# Patient Record
Sex: FEMALE | Race: WHITE | HISPANIC OR LATINO | Employment: OTHER | URBAN - METROPOLITAN AREA
[De-identification: names, ages, dates, MRNs, and addresses within clinical notes are randomized per-mention and may not be internally consistent; named-entity substitution may affect disease eponyms.]

---

## 2021-03-29 ENCOUNTER — IMMUNIZATION (OUTPATIENT)
Dept: PRIMARY CARE CLINIC | Facility: CLINIC | Age: 46
End: 2021-03-29
Payer: MEDICAID

## 2021-03-29 DIAGNOSIS — Z23 NEED FOR VACCINATION: Primary | ICD-10-CM

## 2021-03-29 PROCEDURE — 0011A PR IMMUNIZ ADMIN, SARS-COV-2 COVID-19 VACC, 100MCG/0.5ML, 1ST DOSE: ICD-10-PCS | Mod: CV19,S$GLB,, | Performed by: INTERNAL MEDICINE

## 2021-03-29 PROCEDURE — 91301 PR SARS-COV-2 COVID-19 VACCINE, NO PRSV, 100MCG/0.5ML, IM: ICD-10-PCS | Mod: S$GLB,,, | Performed by: INTERNAL MEDICINE

## 2021-03-29 PROCEDURE — 91301 PR SARS-COV-2 COVID-19 VACCINE, NO PRSV, 100MCG/0.5ML, IM: CPT | Mod: S$GLB,,, | Performed by: INTERNAL MEDICINE

## 2021-03-29 PROCEDURE — 0011A PR IMMUNIZ ADMIN, SARS-COV-2 COVID-19 VACC, 100MCG/0.5ML, 1ST DOSE: CPT | Mod: CV19,S$GLB,, | Performed by: INTERNAL MEDICINE

## 2021-03-29 RX ADMIN — Medication 0.5 ML: at 03:03

## 2021-03-30 ENCOUNTER — LAB VISIT (OUTPATIENT)
Dept: LAB | Facility: HOSPITAL | Age: 46
End: 2021-03-30
Attending: FAMILY MEDICINE
Payer: MEDICAID

## 2021-03-30 ENCOUNTER — OFFICE VISIT (OUTPATIENT)
Dept: FAMILY MEDICINE | Facility: HOSPITAL | Age: 46
End: 2021-03-30
Attending: FAMILY MEDICINE
Payer: MEDICAID

## 2021-03-30 VITALS
HEIGHT: 61 IN | SYSTOLIC BLOOD PRESSURE: 138 MMHG | HEART RATE: 79 BPM | DIASTOLIC BLOOD PRESSURE: 83 MMHG | BODY MASS INDEX: 24.69 KG/M2 | WEIGHT: 130.75 LBS

## 2021-03-30 DIAGNOSIS — D49.2 EAR TUMORS: Primary | ICD-10-CM

## 2021-03-30 DIAGNOSIS — D49.2 EAR TUMORS: ICD-10-CM

## 2021-03-30 DIAGNOSIS — E28.319 EARLY MENOPAUSE: ICD-10-CM

## 2021-03-30 LAB
ALBUMIN SERPL BCP-MCNC: 4.4 G/DL (ref 3.5–5.2)
ALP SERPL-CCNC: 81 U/L (ref 55–135)
ALT SERPL W/O P-5'-P-CCNC: 14 U/L (ref 10–44)
ANION GAP SERPL CALC-SCNC: 7 MMOL/L (ref 8–16)
AST SERPL-CCNC: 16 U/L (ref 10–40)
BASOPHILS # BLD AUTO: 0.04 K/UL (ref 0–0.2)
BASOPHILS NFR BLD: 0.7 % (ref 0–1.9)
BILIRUB SERPL-MCNC: 0.2 MG/DL (ref 0.1–1)
BUN SERPL-MCNC: 13 MG/DL (ref 6–20)
CALCIUM SERPL-MCNC: 9.3 MG/DL (ref 8.7–10.5)
CCP AB SER IA-ACNC: <0.5 U/ML
CHLORIDE SERPL-SCNC: 105 MMOL/L (ref 95–110)
CHOLEST SERPL-MCNC: 210 MG/DL (ref 120–199)
CHOLEST/HDLC SERPL: 3.6 {RATIO} (ref 2–5)
CO2 SERPL-SCNC: 30 MMOL/L (ref 23–29)
CREAT SERPL-MCNC: 0.9 MG/DL (ref 0.5–1.4)
CRP SERPL-MCNC: 4.4 MG/L (ref 0–8.2)
DIFFERENTIAL METHOD: ABNORMAL
EOSINOPHIL # BLD AUTO: 0.1 K/UL (ref 0–0.5)
EOSINOPHIL NFR BLD: 1.6 % (ref 0–8)
ERYTHROCYTE [DISTWIDTH] IN BLOOD BY AUTOMATED COUNT: 13.2 % (ref 11.5–14.5)
ERYTHROCYTE [SEDIMENTATION RATE] IN BLOOD BY WESTERGREN METHOD: 31 MM/HR (ref 0–20)
EST. GFR  (AFRICAN AMERICAN): >60 ML/MIN/1.73 M^2
EST. GFR  (NON AFRICAN AMERICAN): >60 ML/MIN/1.73 M^2
ESTIMATED AVG GLUCOSE: 100 MG/DL (ref 68–131)
GLUCOSE SERPL-MCNC: 70 MG/DL (ref 70–110)
HBA1C MFR BLD: 5.1 % (ref 4–5.6)
HCT VFR BLD AUTO: 37.5 % (ref 37–48.5)
HDLC SERPL-MCNC: 59 MG/DL (ref 40–75)
HDLC SERPL: 28.1 % (ref 20–50)
HGB BLD-MCNC: 12 G/DL (ref 12–16)
IMM GRANULOCYTES # BLD AUTO: 0.01 K/UL (ref 0–0.04)
IMM GRANULOCYTES NFR BLD AUTO: 0.2 % (ref 0–0.5)
LDLC SERPL CALC-MCNC: 84.6 MG/DL (ref 63–159)
LYMPHOCYTES # BLD AUTO: 1.9 K/UL (ref 1–4.8)
LYMPHOCYTES NFR BLD: 33.2 % (ref 18–48)
MCH RBC QN AUTO: 26.5 PG (ref 27–31)
MCHC RBC AUTO-ENTMCNC: 32 G/DL (ref 32–36)
MCV RBC AUTO: 83 FL (ref 82–98)
MONOCYTES # BLD AUTO: 0.4 K/UL (ref 0.3–1)
MONOCYTES NFR BLD: 7.5 % (ref 4–15)
NEUTROPHILS # BLD AUTO: 3.3 K/UL (ref 1.8–7.7)
NEUTROPHILS NFR BLD: 56.8 % (ref 38–73)
NONHDLC SERPL-MCNC: 151 MG/DL
NRBC BLD-RTO: 0 /100 WBC
PLATELET # BLD AUTO: 306 K/UL (ref 150–450)
PMV BLD AUTO: 10.3 FL (ref 9.2–12.9)
POTASSIUM SERPL-SCNC: 3.9 MMOL/L (ref 3.5–5.1)
PROT SERPL-MCNC: 8.4 G/DL (ref 6–8.4)
RBC # BLD AUTO: 4.53 M/UL (ref 4–5.4)
RHEUMATOID FACT SERPL-ACNC: <10 IU/ML (ref 0–15)
SODIUM SERPL-SCNC: 142 MMOL/L (ref 136–145)
TRIGL SERPL-MCNC: 332 MG/DL (ref 30–150)
TSH SERPL DL<=0.005 MIU/L-ACNC: 0.66 UIU/ML (ref 0.4–4)
WBC # BLD AUTO: 5.75 K/UL (ref 3.9–12.7)

## 2021-03-30 PROCEDURE — 86431 RHEUMATOID FACTOR QUANT: CPT | Performed by: STUDENT IN AN ORGANIZED HEALTH CARE EDUCATION/TRAINING PROGRAM

## 2021-03-30 PROCEDURE — 86235 NUCLEAR ANTIGEN ANTIBODY: CPT | Performed by: STUDENT IN AN ORGANIZED HEALTH CARE EDUCATION/TRAINING PROGRAM

## 2021-03-30 PROCEDURE — 80061 LIPID PANEL: CPT | Performed by: STUDENT IN AN ORGANIZED HEALTH CARE EDUCATION/TRAINING PROGRAM

## 2021-03-30 PROCEDURE — 86803 HEPATITIS C AB TEST: CPT | Performed by: STUDENT IN AN ORGANIZED HEALTH CARE EDUCATION/TRAINING PROGRAM

## 2021-03-30 PROCEDURE — 84443 ASSAY THYROID STIM HORMONE: CPT | Performed by: STUDENT IN AN ORGANIZED HEALTH CARE EDUCATION/TRAINING PROGRAM

## 2021-03-30 PROCEDURE — 83036 HEMOGLOBIN GLYCOSYLATED A1C: CPT | Performed by: STUDENT IN AN ORGANIZED HEALTH CARE EDUCATION/TRAINING PROGRAM

## 2021-03-30 PROCEDURE — 36415 COLL VENOUS BLD VENIPUNCTURE: CPT | Performed by: STUDENT IN AN ORGANIZED HEALTH CARE EDUCATION/TRAINING PROGRAM

## 2021-03-30 PROCEDURE — 86200 CCP ANTIBODY: CPT | Performed by: STUDENT IN AN ORGANIZED HEALTH CARE EDUCATION/TRAINING PROGRAM

## 2021-03-30 PROCEDURE — 80053 COMPREHEN METABOLIC PANEL: CPT | Performed by: STUDENT IN AN ORGANIZED HEALTH CARE EDUCATION/TRAINING PROGRAM

## 2021-03-30 PROCEDURE — 85025 COMPLETE CBC W/AUTO DIFF WBC: CPT | Performed by: STUDENT IN AN ORGANIZED HEALTH CARE EDUCATION/TRAINING PROGRAM

## 2021-03-30 PROCEDURE — 85652 RBC SED RATE AUTOMATED: CPT | Performed by: STUDENT IN AN ORGANIZED HEALTH CARE EDUCATION/TRAINING PROGRAM

## 2021-03-30 PROCEDURE — 99213 OFFICE O/P EST LOW 20 MIN: CPT | Performed by: STUDENT IN AN ORGANIZED HEALTH CARE EDUCATION/TRAINING PROGRAM

## 2021-03-30 PROCEDURE — 86140 C-REACTIVE PROTEIN: CPT | Performed by: STUDENT IN AN ORGANIZED HEALTH CARE EDUCATION/TRAINING PROGRAM

## 2021-03-30 PROCEDURE — 86038 ANTINUCLEAR ANTIBODIES: CPT | Performed by: STUDENT IN AN ORGANIZED HEALTH CARE EDUCATION/TRAINING PROGRAM

## 2021-03-31 LAB
ANA SER QL IF: NORMAL
HCV AB SERPL QL IA: NEGATIVE

## 2021-04-01 LAB
ANTI-SSA ANTIBODY: 0.06 RATIO (ref 0–0.99)
ANTI-SSA INTERPRETATION: NEGATIVE

## 2021-04-14 ENCOUNTER — PATIENT MESSAGE (OUTPATIENT)
Dept: FAMILY MEDICINE | Facility: HOSPITAL | Age: 46
End: 2021-04-14

## 2021-04-28 ENCOUNTER — IMMUNIZATION (OUTPATIENT)
Dept: PRIMARY CARE CLINIC | Facility: CLINIC | Age: 46
End: 2021-04-28
Payer: MEDICAID

## 2021-04-28 DIAGNOSIS — Z23 NEED FOR VACCINATION: Primary | ICD-10-CM

## 2021-04-28 PROCEDURE — 0012A PR IMMUNIZ ADMIN, SARS-COV-2 COVID-19 VACC, 100MCG/0.5ML, 2ND DOSE: ICD-10-PCS | Mod: CV19,S$GLB,, | Performed by: INTERNAL MEDICINE

## 2021-04-28 PROCEDURE — 91301 PR SARS-COV-2 COVID-19 VACCINE, NO PRSV, 100MCG/0.5ML, IM: ICD-10-PCS | Mod: S$GLB,,, | Performed by: INTERNAL MEDICINE

## 2021-04-28 PROCEDURE — 0012A PR IMMUNIZ ADMIN, SARS-COV-2 COVID-19 VACC, 100MCG/0.5ML, 2ND DOSE: CPT | Mod: CV19,S$GLB,, | Performed by: INTERNAL MEDICINE

## 2021-04-28 PROCEDURE — 91301 PR SARS-COV-2 COVID-19 VACCINE, NO PRSV, 100MCG/0.5ML, IM: CPT | Mod: S$GLB,,, | Performed by: INTERNAL MEDICINE

## 2021-04-28 RX ADMIN — Medication 0.5 ML: at 05:04

## 2022-01-16 ENCOUNTER — OFFICE VISIT (OUTPATIENT)
Dept: URGENT CARE | Facility: CLINIC | Age: 47
End: 2022-01-16
Payer: MEDICAID

## 2022-01-16 VITALS
RESPIRATION RATE: 18 BRPM | HEIGHT: 61 IN | OXYGEN SATURATION: 98 % | WEIGHT: 120 LBS | DIASTOLIC BLOOD PRESSURE: 82 MMHG | TEMPERATURE: 99 F | BODY MASS INDEX: 22.66 KG/M2 | HEART RATE: 69 BPM | SYSTOLIC BLOOD PRESSURE: 121 MMHG

## 2022-01-16 DIAGNOSIS — U07.1 COVID-19: ICD-10-CM

## 2022-01-16 DIAGNOSIS — J02.9 SORE THROAT: Primary | ICD-10-CM

## 2022-01-16 PROBLEM — H95.191: Status: ACTIVE | Noted: 2021-12-22

## 2022-01-16 PROBLEM — H90.71 MIXED CONDUCTIVE AND SENSORINEURAL HEARING LOSS OF RIGHT EAR WITH UNRESTRICTED HEARING OF LEFT EAR: Status: ACTIVE | Noted: 2021-12-22

## 2022-01-16 PROBLEM — H66.91 RIGHT CHRONIC OTITIS MEDIA: Status: ACTIVE | Noted: 2021-12-22

## 2022-01-16 LAB
CTP QC/QA: YES
SARS-COV-2 RDRP RESP QL NAA+PROBE: POSITIVE

## 2022-01-16 PROCEDURE — U0002 COVID-19 LAB TEST NON-CDC: HCPCS | Mod: QW,CR,S$GLB,

## 2022-01-16 PROCEDURE — U0002: ICD-10-PCS | Mod: QW,CR,S$GLB,

## 2022-01-16 PROCEDURE — 1160F RVW MEDS BY RX/DR IN RCRD: CPT | Mod: CPTII,S$GLB,,

## 2022-01-16 PROCEDURE — 3008F PR BODY MASS INDEX (BMI) DOCUMENTED: ICD-10-PCS | Mod: CPTII,S$GLB,,

## 2022-01-16 PROCEDURE — 3079F PR MOST RECENT DIASTOLIC BLOOD PRESSURE 80-89 MM HG: ICD-10-PCS | Mod: CPTII,S$GLB,,

## 2022-01-16 PROCEDURE — 99203 OFFICE O/P NEW LOW 30 MIN: CPT | Mod: S$GLB,,,

## 2022-01-16 PROCEDURE — 1159F PR MEDICATION LIST DOCUMENTED IN MEDICAL RECORD: ICD-10-PCS | Mod: CPTII,S$GLB,,

## 2022-01-16 PROCEDURE — 3074F PR MOST RECENT SYSTOLIC BLOOD PRESSURE < 130 MM HG: ICD-10-PCS | Mod: CPTII,S$GLB,,

## 2022-01-16 PROCEDURE — 3079F DIAST BP 80-89 MM HG: CPT | Mod: CPTII,S$GLB,,

## 2022-01-16 PROCEDURE — 1160F PR REVIEW ALL MEDS BY PRESCRIBER/CLIN PHARMACIST DOCUMENTED: ICD-10-PCS | Mod: CPTII,S$GLB,,

## 2022-01-16 PROCEDURE — 3074F SYST BP LT 130 MM HG: CPT | Mod: CPTII,S$GLB,,

## 2022-01-16 PROCEDURE — 99203 PR OFFICE/OUTPT VISIT, NEW, LEVL III, 30-44 MIN: ICD-10-PCS | Mod: S$GLB,,,

## 2022-01-16 PROCEDURE — 3008F BODY MASS INDEX DOCD: CPT | Mod: CPTII,S$GLB,,

## 2022-01-16 PROCEDURE — 1159F MED LIST DOCD IN RCRD: CPT | Mod: CPTII,S$GLB,,

## 2022-01-16 NOTE — PROGRESS NOTES
"Subjective:       Patient ID: Gabrielle Chung is a 46 y.o. female.    Vitals:  height is 5' 1" (1.549 m) and weight is 54.4 kg (120 lb). Her temperature is 98.9 °F (37.2 °C). Her blood pressure is 121/82 and her pulse is 69. Her respiration is 18 and oxygen saturation is 98%.     Chief Complaint: Sore Throat    Pt. presents to clinic with c/o sore throat for over 1 week. She denies any other symptoms. Denies any fever or body aches.     Sore Throat   This is a new problem. The current episode started in the past 7 days. The problem has been unchanged. Neither side of throat is experiencing more pain than the other. There has been no fever. Pertinent negatives include no coughing, diarrhea, ear pain, neck pain or vomiting. She has tried nothing for the symptoms. The treatment provided no relief.       Constitution: Negative for chills, sweating, fatigue and fever.   HENT: Positive for sore throat. Negative for ear pain and tinnitus.    Neck: Negative for neck pain and neck stiffness.   Cardiovascular: Negative for chest trauma and chest pain.   Eyes: Negative for eye itching, eye pain and eye redness.   Respiratory: Negative for cough, sputum production and bloody sputum.    Gastrointestinal: Negative for nausea, vomiting, constipation and diarrhea.   Skin: Negative for color change, pale, rash and hives.   Allergic/Immunologic: Negative for hives, itching and sneezing.   Neurological: Negative for disorientation and altered mental status.   Psychiatric/Behavioral: Negative for altered mental status, disorientation and confusion.       Objective:      Physical Exam   Constitutional: She is oriented to person, place, and time. She appears well-developed and well-nourished. She is cooperative.  Non-toxic appearance. She does not have a sickly appearance. She does not appear ill. No distress.   HENT:   Head: Normocephalic and atraumatic.   Ears:   Right Ear: Hearing, tympanic membrane, external ear and ear canal normal. "   Left Ear: Hearing, tympanic membrane, external ear and ear canal normal.   Nose: Nose normal. No mucosal edema, rhinorrhea or nasal deformity. No epistaxis. Right sinus exhibits no maxillary sinus tenderness and no frontal sinus tenderness. Left sinus exhibits no maxillary sinus tenderness and no frontal sinus tenderness.   Mouth/Throat: Uvula is midline and mucous membranes are normal. No trismus in the jaw. Normal dentition. No uvula swelling. Posterior oropharyngeal erythema present. No oropharyngeal exudate or posterior oropharyngeal edema.   Eyes: Conjunctivae and lids are normal. No scleral icterus.   Neck: Trachea normal and phonation normal. Neck supple. No edema present. No erythema present. No neck rigidity present.   Cardiovascular: Normal rate, regular rhythm, normal heart sounds, intact distal pulses and normal pulses.   Pulmonary/Chest: Effort normal and breath sounds normal. No respiratory distress. She has no decreased breath sounds. She has no rhonchi.   Abdominal: Normal appearance.   Musculoskeletal: Normal range of motion.         General: No deformity or edema. Normal range of motion.   Neurological: She is alert and oriented to person, place, and time. She exhibits normal muscle tone. Coordination normal.   Skin: Skin is warm, dry, intact, not diaphoretic and not pale.   Psychiatric: She has a normal mood and affect. Her speech is normal and behavior is normal. Judgment and thought content normal. Cognition and memory  Nursing note and vitals reviewed.    Results for orders placed or performed in visit on 01/16/22   POCT COVID-19 Rapid Screening   Result Value Ref Range    POC Rapid COVID Positive (A) Negative     Acceptable Yes      0        Assessment:       1. Sore throat    2. COVID-19          Plan:         Sore throat  -     POCT COVID-19 Rapid Screening    COVID-19                  Patient Instructions    You have tested positive for COVID-19 today.       ISOLATION  If  you tested positive and do not have symptoms, you must isolate for 5 days starting on the day of the positive test. I     If you tested positive and have symptoms, you must isolate for 5 days starting on the day of the first symptoms,  not the day of the positive test.     This is the most important part, both the CDC and the [facility] emphasize that you do not test out of isolation.     After 5 days, if your symptoms have improved and you have not had fever on day 5, you can return to the community on day 6- NO TESTING REQUIRED!      In fact, we do not retest if you were positive in the last 90 days.     After your 5 days of isolation are completed, the CDC recommends strict mask use for the first 5 days that you come out of isolation.      CDC Testing and Quarantine Guidelines for patients with exposure to a known-positive COVID-19 person:  ·     A 'close exposure' is defined as anyone who has had an exposure (masked or unmasked) to a known COVID -19 positive person          within 6 feet of someone          for a cumulative total of 15 minutes or more over a 24-hour period.  ·     vaccinated Have been boosted or completed the primary series of Pfizer or Moderna vaccine within the last 6 months or completed the primary series of J&J vaccine within the last 2 months and/or had a positive test within 90 days          do NOT need to quarantine after contact with someone who had COVID-19 unless they have symptoms.          fully vaccinated people who have not had a positive test within 90 days, should get tested 3-5 days after their exposure, even if they don't have symptoms and wear a mask indoors in public for 10 days following exposure or until their test result is negative on day 5.          If you develop symptoms test and quarantine.     ·     Unvaccinated, or are more than six months out from their second mRNA dose (or more than 2 months after the J&J vaccine) and not yet boosted,  and/or NOT had a positive test  within 90 days and meet 'close exposure'  you are required by CDC guidelines to quarantine for at least 5 days from time of exposure followed by 5 days of strict masking. It is recommended, but not required to test after 5 days, unless you develop symptoms, in which case you should test at that time.  If you do decide to test at 5 days and are asymptomatic, the risk is that if you test without symptoms on Day 5 for example) and you are positive, your 5 day isolation begins on that day, and you turned your 5 day quarantine into 10 days.          If your exposure does not meet the above definition, you can return to your normal daily activities to include social distancing, wearing a mask and frequent handwashing.  Alternatively, if a 5-day quarantine is not feasible, it is imperative that an exposed person wear a well-fitting mask at all times when around others for 10 days after exposure.          - Rest.    - Drink plenty of fluids.    - Acetaminophen (tylenol) or Ibuprofen (advil,motrin) as directed as needed for fever/pain. Avoid tylenol if you have a history of liver disease. Do not take ibuprofen if you have a history of GI bleeding, kidney disease, or if you take blood thinners.     - You must understand that you have received an Urgent Care treatment only and that you may be released before all of your medical problems are known or treated.   - You, the patient, will arrange for follow up care as instructed.   - If your condition worsens or fails to improve we recommend that you receive another evaluation at the ER immediately or contact your PCP to discuss your concerns or return here.   - Follow up with your PCP or specialty clinic as directed in the next 1-2 weeks if not improved or as needed.  You can call [phone] to schedule an appointment with the appropriate provider.      If your symptoms do not improve or worsen, go to the emergency room immediately.    Patient Education       COVID-19  Discharge Instructions   About this topic   Coronavirus disease 2019 is also known as COVID-19. It is a viral illness that infects the lungs. It is caused by a virus called SARS-associated coronavirus (SARS-CoV-2).  The signs of COVID-19 most often start a few days after you have been infected. In some people, it takes longer to show signs. Others never show signs of the infection. You may have a cough, fever, shaking chills and it may be hard to breathe. You may be very tired, have muscle aches, a headache or sore throat. Some people have an upset stomach or loose stools. Others lose their sense of smell or taste. You may not have these signs all the time and they may come and go while you are sick.  The virus spreads easily through droplets when you talk, sneeze, or cough. You can pass the virus to others when you are talking close together, singing, hugging, sharing food, or shaking hands. Doctors believe the germs also survive on surfaces like tables, door handles, and telephones. However, this is not a common way that COVID-19 spreads. Doctors believe you can also spread the infection even if you dont have any symptoms, but they do not know how that happens. This is why getting vaccinated is one of the best ways to keep you healthy and slow the spread of the virus.  Some people have a mild case of COVID-19 and are able to stay at home and away from others until they feel better. Others may need to be in the hospital if they are very sick. Some people with COVID-19 can have some symptoms for weeks or months. People with COVID-19 must isolate themselves. You can start to be around others when your doctor says it is safe to do so.       What care is needed at home?   · Ask your doctor what you need to do when you go home. Make sure you ask questions if you do not understand what the doctor says.  · Drink lots of water, juice, or broth to replace fluids lost from a fever.  · You may use cool mist humidifiers to  help ease congestion and coughing.  · Use 2 to 3 pillows to prop yourself up when you lie down to make it easier to breathe and sleep.  · Do not smoke and do not drink beer, wine, and mixed drinks (alcohol).  · To lower the chance of passing the infection to others, get a COVID-19 vaccine after your infection has resolved.  · If you have not been fully vaccinated:  ? Wear a mask over your mouth and nose if you are around others who are not sick. Cloth masks work best if they have more than one layer of fabric.  ? Wash your hands often.  ? Stay home in a separate room, if possible, away from others. Only go out to get medical care.  ? Use a separate bathroom if possible.  ? Do not make food for others.  What follow-up care is needed?   · Your doctor may ask you to make visits to the office to check on your progress. Be sure to keep these visits. Make sure you wear a mask at these visits.  · If you can, tell the staff you have COVID-19 ahead of time so they can take extra care to stop the disease from spreading.  · It may take a few weeks before your health returns to normal.  What drugs may be needed?   The doctor may order drugs to:  · Help with breathing  · Help with fever  · Help with swelling in your airways and lungs  · Control coughing  · Ease a sore throat  · Help a runny or stuffy nose  Will physical activity be limited?   You may have to limit your physical activity. Talk to your doctor about the right amount of activity for you. If you have been very sick with COVID-19, it can take some time to get your strength back.  Will there be any other care needed?   Doctors do not know how long you can pass the virus on to others after you are sick. This is why it is important to stay in a separate room, if possible, when you are sick. For now, doctors are giving general guidelines for you to follow after you have been sick. Before you go around other people, you should:  · Be fever free for 24 hours without taking  any drugs to lower the fever  · Have no symptoms of cough or shortness of breath  · Wait at least 10 days after first having symptoms or your first positive test, and you need to be symptom free as above. Some experts suggest waiting 20 days if you have had a more severe infection.  Talk with your doctor about getting a COVID-19 vaccine.  What problems could happen?   · Fluid loss. This is dehydration.  · Short-term or long-term lung damage  · Heart problems  · Death  When do I need to call the doctor?   · You are having so much trouble breathing that you can only say one or two words at a time.  · You need to sit upright at all times to be able to breathe and/or cannot lie down.  · You are very confused or cannot stay awake.  · Your lips or skin start to turn blue or grey.  · You think you might be having a medical emergency. Some examples of medical emergencies are:  ? Severe chest pain.  ? Not able to speak or move normally.  · You have trouble breathing when talking or sitting still.  · You have new shortness of breath.  · You become weak or dizzy.  · You have very dark urine or do not pass urine for more than 8 hours.  · You have new or worsening COVID-19 symptoms like:  ? Fever  ? Cough  ? Feeling very tired  ? Shaking chills  ? Headache  ? Trouble swallowing  ? Throwing up  ? Loose stools  ? Reddish purple spots on your fingers or toes  Teach Back: Helping You Understand   The Teach Back Method helps you understand the information we are giving you. After you talk with the staff, tell them in your own words what you learned. This helps to make sure the staff has described each thing clearly. It also helps to explain things that may have been confusing. Before going home, make sure you can do these:  · I can tell you about my condition.  · I can tell you what may help ease my breathing.  · I can tell you what I can do to help avoid passing the infection to others.  · I can tell you what I will do if I have  trouble breathing; feel sleepy or confused; or my fingertips, fingernails, skin, or lips are blue.  Where can I learn more?   Centers for Disease Control and Prevention  https://www.cdc.gov/coronavirus/2019-ncov/about/index.html   Centers for Disease Control and Prevention  https://www.cdc.gov/coronavirus/2019-ncov/hcp/disposition-in-home-patients.html   World Health Organization  https://www.who.int/news-room/q-a-detail/l-z-ogfzxouoxcygl   Last Reviewed Date   2021-10-05  Consumer Information Use and Disclaimer   This information is not specific medical advice and does not replace information you receive from your health care provider. This is only a brief summary of general information. It does NOT include all information about conditions, illnesses, injuries, tests, procedures, treatments, therapies, discharge instructions or life-style choices that may apply to you. You must talk with your health care provider for complete information about your health and treatment options. This information should not be used to decide whether or not to accept your health care providers advice, instructions or recommendations. Only your health care provider has the knowledge and training to provide advice that is right for you.  Copyright   Copyright © 2021 [employer] and its affiliates and/or licensors. All rights reserved.

## 2022-01-16 NOTE — LETTER
January 16, 2022      [facility]  [address] [address]  Phone: [phone]  Fax: [phone]       Patient: Gabrielle Chung   YOB: 1975  Date of Visit: 01/16/2022    To Whom It May Concern:    Mahendra Chung  was at [facility] on 01/16/2022. The patient may return to work/school on 01/17/22 with no restrictions. If you have any questions or concerns, or if I can be of further assistance, please do not hesitate to contact me.    Sincerely,    Danielle Hays PA-C

## 2022-01-16 NOTE — PATIENT INSTRUCTIONS
You have tested positive for COVID-19 today.       ISOLATION  If you tested positive and do not have symptoms, you must isolate for 5 days starting on the day of the positive test. I     If you tested positive and have symptoms, you must isolate for 5 days starting on the day of the first symptoms,  not the day of the positive test.     This is the most important part, both the CDC and the [facility] emphasize that you do not test out of isolation.     After 5 days, if your symptoms have improved and you have not had fever on day 5, you can return to the community on day 6- NO TESTING REQUIRED!      In fact, we do not retest if you were positive in the last 90 days.     After your 5 days of isolation are completed, the CDC recommends strict mask use for the first 5 days that you come out of isolation.      CDC Testing and Quarantine Guidelines for patients with exposure to a known-positive COVID-19 person:  ·     A 'close exposure' is defined as anyone who has had an exposure (masked or unmasked) to a known COVID -19 positive person          within 6 feet of someone          for a cumulative total of 15 minutes or more over a 24-hour period.  ·     vaccinated Have been boosted or completed the primary series of Pfizer or Moderna vaccine within the last 6 months or completed the primary series of J&J vaccine within the last 2 months and/or had a positive test within 90 days          do NOT need to quarantine after contact with someone who had COVID-19 unless they have symptoms.          fully vaccinated people who have not had a positive test within 90 days, should get tested 3-5 days after their exposure, even if they don't have symptoms and wear a mask indoors in public for 10 days following exposure or until their test result is negative on day 5.          If you develop symptoms test and quarantine.     ·     Unvaccinated, or are more than six months out from their second mRNA dose (or more than 2 months after the J&J  vaccine) and not yet boosted,  and/or NOT had a positive test within 90 days and meet 'close exposure'  you are required by CDC guidelines to quarantine for at least 5 days from time of exposure followed by 5 days of strict masking. It is recommended, but not required to test after 5 days, unless you develop symptoms, in which case you should test at that time.  If you do decide to test at 5 days and are asymptomatic, the risk is that if you test without symptoms on Day 5 for example) and you are positive, your 5 day isolation begins on that day, and you turned your 5 day quarantine into 10 days.          If your exposure does not meet the above definition, you can return to your normal daily activities to include social distancing, wearing a mask and frequent handwashing.  Alternatively, if a 5-day quarantine is not feasible, it is imperative that an exposed person wear a well-fitting mask at all times when around others for 10 days after exposure.          - Rest.    - Drink plenty of fluids.    - Acetaminophen (tylenol) or Ibuprofen (advil,motrin) as directed as needed for fever/pain. Avoid tylenol if you have a history of liver disease. Do not take ibuprofen if you have a history of GI bleeding, kidney disease, or if you take blood thinners.     - You must understand that you have received an Urgent Care treatment only and that you may be released before all of your medical problems are known or treated.   - You, the patient, will arrange for follow up care as instructed.   - If your condition worsens or fails to improve we recommend that you receive another evaluation at the ER immediately or contact your PCP to discuss your concerns or return here.   - Follow up with your PCP or specialty clinic as directed in the next 1-2 weeks if not improved or as needed.  You can call [phone] to schedule an appointment with the appropriate provider.      If your symptoms do not improve or worsen, go to the emergency  room immediately.    Patient Education       COVID-19 Discharge Instructions   About this topic   Coronavirus disease 2019 is also known as COVID-19. It is a viral illness that infects the lungs. It is caused by a virus called SARS-associated coronavirus (SARS-CoV-2).  The signs of COVID-19 most often start a few days after you have been infected. In some people, it takes longer to show signs. Others never show signs of the infection. You may have a cough, fever, shaking chills and it may be hard to breathe. You may be very tired, have muscle aches, a headache or sore throat. Some people have an upset stomach or loose stools. Others lose their sense of smell or taste. You may not have these signs all the time and they may come and go while you are sick.  The virus spreads easily through droplets when you talk, sneeze, or cough. You can pass the virus to others when you are talking close together, singing, hugging, sharing food, or shaking hands. Doctors believe the germs also survive on surfaces like tables, door handles, and telephones. However, this is not a common way that COVID-19 spreads. Doctors believe you can also spread the infection even if you dont have any symptoms, but they do not know how that happens. This is why getting vaccinated is one of the best ways to keep you healthy and slow the spread of the virus.  Some people have a mild case of COVID-19 and are able to stay at home and away from others until they feel better. Others may need to be in the hospital if they are very sick. Some people with COVID-19 can have some symptoms for weeks or months. People with COVID-19 must isolate themselves. You can start to be around others when your doctor says it is safe to do so.       What care is needed at home?   · Ask your doctor what you need to do when you go home. Make sure you ask questions if you do not understand what the doctor says.  · Drink lots of water, juice, or broth to replace fluids lost  from a fever.  · You may use cool mist humidifiers to help ease congestion and coughing.  · Use 2 to 3 pillows to prop yourself up when you lie down to make it easier to breathe and sleep.  · Do not smoke and do not drink beer, wine, and mixed drinks (alcohol).  · To lower the chance of passing the infection to others, get a COVID-19 vaccine after your infection has resolved.  · If you have not been fully vaccinated:  ? Wear a mask over your mouth and nose if you are around others who are not sick. Cloth masks work best if they have more than one layer of fabric.  ? Wash your hands often.  ? Stay home in a separate room, if possible, away from others. Only go out to get medical care.  ? Use a separate bathroom if possible.  ? Do not make food for others.  What follow-up care is needed?   · Your doctor may ask you to make visits to the office to check on your progress. Be sure to keep these visits. Make sure you wear a mask at these visits.  · If you can, tell the staff you have COVID-19 ahead of time so they can take extra care to stop the disease from spreading.  · It may take a few weeks before your health returns to normal.  What drugs may be needed?   The doctor may order drugs to:  · Help with breathing  · Help with fever  · Help with swelling in your airways and lungs  · Control coughing  · Ease a sore throat  · Help a runny or stuffy nose  Will physical activity be limited?   You may have to limit your physical activity. Talk to your doctor about the right amount of activity for you. If you have been very sick with COVID-19, it can take some time to get your strength back.  Will there be any other care needed?   Doctors do not know how long you can pass the virus on to others after you are sick. This is why it is important to stay in a separate room, if possible, when you are sick. For now, doctors are giving general guidelines for you to follow after you have been sick. Before you go around other people, you  should:  · Be fever free for 24 hours without taking any drugs to lower the fever  · Have no symptoms of cough or shortness of breath  · Wait at least 10 days after first having symptoms or your first positive test, and you need to be symptom free as above. Some experts suggest waiting 20 days if you have had a more severe infection.  Talk with your doctor about getting a COVID-19 vaccine.  What problems could happen?   · Fluid loss. This is dehydration.  · Short-term or long-term lung damage  · Heart problems  · Death  When do I need to call the doctor?   · You are having so much trouble breathing that you can only say one or two words at a time.  · You need to sit upright at all times to be able to breathe and/or cannot lie down.  · You are very confused or cannot stay awake.  · Your lips or skin start to turn blue or grey.  · You think you might be having a medical emergency. Some examples of medical emergencies are:  ? Severe chest pain.  ? Not able to speak or move normally.  · You have trouble breathing when talking or sitting still.  · You have new shortness of breath.  · You become weak or dizzy.  · You have very dark urine or do not pass urine for more than 8 hours.  · You have new or worsening COVID-19 symptoms like:  ? Fever  ? Cough  ? Feeling very tired  ? Shaking chills  ? Headache  ? Trouble swallowing  ? Throwing up  ? Loose stools  ? Reddish purple spots on your fingers or toes  Teach Back: Helping You Understand   The Teach Back Method helps you understand the information we are giving you. After you talk with the staff, tell them in your own words what you learned. This helps to make sure the staff has described each thing clearly. It also helps to explain things that may have been confusing. Before going home, make sure you can do these:  · I can tell you about my condition.  · I can tell you what may help ease my breathing.  · I can tell you what I can do to help avoid passing the infection to  others.  · I can tell you what I will do if I have trouble breathing; feel sleepy or confused; or my fingertips, fingernails, skin, or lips are blue.  Where can I learn more?   Centers for Disease Control and Prevention  https://www.cdc.gov/coronavirus/2019-ncov/about/index.html   Centers for Disease Control and Prevention  https://www.cdc.gov/coronavirus/2019-ncov/hcp/disposition-in-home-patients.html   World Health Organization  https://www.who.int/news-room/q-a-detail/a-k-ewbmcmyhxovcx   Last Reviewed Date   2021-10-05  Consumer Information Use and Disclaimer   This information is not specific medical advice and does not replace information you receive from your health care provider. This is only a brief summary of general information. It does NOT include all information about conditions, illnesses, injuries, tests, procedures, treatments, therapies, discharge instructions or life-style choices that may apply to you. You must talk with your health care provider for complete information about your health and treatment options. This information should not be used to decide whether or not to accept your health care providers advice, instructions or recommendations. Only your health care provider has the knowledge and training to provide advice that is right for you.  Copyright   Copyright © 2021 [employer] and its affiliates and/or licensors. All rights reserved.

## 2022-06-13 ENCOUNTER — OFFICE VISIT (OUTPATIENT)
Dept: FAMILY MEDICINE | Facility: HOSPITAL | Age: 47
End: 2022-06-13
Attending: FAMILY MEDICINE
Payer: MEDICAID

## 2022-06-13 VITALS
WEIGHT: 122.81 LBS | SYSTOLIC BLOOD PRESSURE: 123 MMHG | HEIGHT: 61 IN | HEART RATE: 62 BPM | BODY MASS INDEX: 23.19 KG/M2 | DIASTOLIC BLOOD PRESSURE: 82 MMHG

## 2022-06-13 DIAGNOSIS — Z12.31 SCREENING MAMMOGRAM FOR BREAST CANCER: ICD-10-CM

## 2022-06-13 DIAGNOSIS — Z12.11 SCREENING FOR MALIGNANT NEOPLASM OF COLON: ICD-10-CM

## 2022-06-13 DIAGNOSIS — Z23 IMMUNIZATION DUE: Primary | ICD-10-CM

## 2022-06-13 PROCEDURE — 99213 OFFICE O/P EST LOW 20 MIN: CPT | Performed by: STUDENT IN AN ORGANIZED HEALTH CARE EDUCATION/TRAINING PROGRAM

## 2022-06-13 PROCEDURE — 90715 TDAP VACCINE 7 YRS/> IM: CPT

## 2022-06-13 NOTE — PROGRESS NOTES
Per verbal order Dr. Payan, pt given TDAP vaccine IM in right deltoid. Bandaid applied. Tolerated well.

## 2022-06-13 NOTE — PROGRESS NOTES
"Progress Note  [facility]      Chief Complaint:   Chief Complaint   Patient presents with    Referral     Mammogram        Subjective:    Gabrielle Chung is a 46 y.o.female with no pertinent history who is here for preventative care. Due for mammo, reported normal in the past. Never had a colonoscopy. Due for TDAP. Concerned that she may need hep b vaccine. May need ear surgery per ENT.     Review of Systems    Review of Systems   Constitutional: Negative for chills, fever and weight loss.   Eyes: Negative for blurred vision.   Respiratory: Negative for cough, hemoptysis, sputum production and shortness of breath.    Cardiovascular: Negative for chest pain, palpitations, orthopnea, claudication and leg swelling.   Gastrointestinal: Negative for abdominal pain, blood in stool, constipation, diarrhea, heartburn, melena, nausea and vomiting.   Genitourinary: Negative for dysuria, hematuria and urgency.   Skin: Negative for itching and rash.   Neurological: Negative for dizziness, tingling, weakness and headaches.   Psychiatric/Behavioral: Negative for depression, substance abuse and suicidal ideas. The patient is not nervous/anxious and does not have insomnia.         Past medical, past surgical, social, and family history reviewed.    Objective:    /82 (BP Location: Left arm)   Pulse 62   Ht 5' 1" (1.549 m)   Wt 55.7 kg (122 lb 12.7 oz)   BMI 23.20 kg/m²     Physical Exam:  Physical Exam  Vitals and nursing note reviewed.   Constitutional:       General: She is not in acute distress.     Appearance: She is not diaphoretic.   HENT:      Head: Normocephalic and atraumatic.   Cardiovascular:      Rate and Rhythm: Normal rate and regular rhythm.      Heart sounds: No murmur heard.    No friction rub. No gallop.   Pulmonary:      Effort: Pulmonary effort is normal. No respiratory distress.      Breath sounds: Normal breath sounds. No wheezing or rales.   Chest:      Chest wall: No tenderness.   Abdominal: "      General: Bowel sounds are normal. There is no distension.      Palpations: Abdomen is soft.      Tenderness: There is no abdominal tenderness.   Skin:     General: Skin is warm and dry.      Findings: No erythema.   Neurological:      Mental Status: She is alert and oriented to person, place, and time.   Psychiatric:         Mood and Affect: Mood and affect normal.         Cognition and Memory: Memory normal.         Judgment: Judgment normal.         Laboratory:    Reviewed labs and imaging.      Assessment Plan    1. Screening mammogram for breast cancer  - Mammo Digital Screening Bilat w/ T[facility]; Future    2. Screening for malignant neoplasm of colon  - Case Request Endoscopy: COLONOSCOPY    3. Immunization due  Will obtain hep b s ab titer to determine need for hep b vaccine.   - (In Office Administered) Tdap Vaccine  - Hepatitis B Surface Ab, Qualitative; Future        Follow Up:  6 months or sooner PRN    The patient's diagnosis and medications were discussed.    I will review labs and notify patient with results either by mail or contact by phone.      06/13/2022  Brant Payan M.D.  [facility] PGY-3    *This note was dictated using the M*Modal Fluency Direct word recognition program. There are word recognition mistakes that are occasionally missed on review.

## 2022-06-14 NOTE — PROGRESS NOTES
I assume primary medical responsibility for this patient. I have reviewed the history, physical, and assessement & treatment plan with the resident and agree that the care is reasonable and necessary. This service has been performed by a resident without the presence of a teaching physician under the primary care exception. If necessary, an addendum of additional findings or evaluation beyond the resident documentation will be noted below.    Wellness visit completed.     Nery Zabala MD    [facility]

## 2022-06-15 ENCOUNTER — HOSPITAL ENCOUNTER (OUTPATIENT)
Dept: RADIOLOGY | Facility: HOSPITAL | Age: 47
Discharge: HOME | End: 2022-06-15
Attending: STUDENT IN AN ORGANIZED HEALTH CARE EDUCATION/TRAINING PROGRAM
Payer: MEDICAID

## 2022-06-15 DIAGNOSIS — Z12.31 SCREENING MAMMOGRAM FOR BREAST CANCER: ICD-10-CM

## 2022-06-15 PROCEDURE — 77067 MAMMO DIGITAL SCREENING BILAT WITH TOMO: ICD-10-PCS | Mod: 26,,, | Performed by: RADIOLOGY

## 2022-06-15 PROCEDURE — 77063 BREAST TOMOSYNTHESIS BI: CPT | Mod: TC

## 2022-06-15 PROCEDURE — 77067 SCR MAMMO BI INCL CAD: CPT | Mod: 26,,, | Performed by: RADIOLOGY

## 2022-06-15 PROCEDURE — 77063 MAMMO DIGITAL SCREENING BILAT WITH TOMO: ICD-10-PCS | Mod: 26,,, | Performed by: RADIOLOGY

## 2022-06-15 PROCEDURE — 77063 BREAST TOMOSYNTHESIS BI: CPT | Mod: 26,,, | Performed by: RADIOLOGY

## 2022-06-23 ENCOUNTER — OFFICE VISIT (OUTPATIENT)
Dept: FAMILY MEDICINE | Facility: HOSPITAL | Age: 47
End: 2022-06-23
Attending: SPECIALIST
Payer: MEDICAID

## 2022-06-23 VITALS
HEIGHT: 61 IN | HEART RATE: 73 BPM | BODY MASS INDEX: 23.11 KG/M2 | DIASTOLIC BLOOD PRESSURE: 76 MMHG | SYSTOLIC BLOOD PRESSURE: 110 MMHG | WEIGHT: 122.38 LBS

## 2022-06-23 DIAGNOSIS — Z12.4 SCREENING FOR CERVICAL CANCER: ICD-10-CM

## 2022-06-23 DIAGNOSIS — Z78.0 MENOPAUSE: ICD-10-CM

## 2022-06-23 DIAGNOSIS — Z28.39 IMMUNIZATION DEFICIENCY: Primary | ICD-10-CM

## 2022-06-23 PROCEDURE — 99213 OFFICE O/P EST LOW 20 MIN: CPT | Performed by: STUDENT IN AN ORGANIZED HEALTH CARE EDUCATION/TRAINING PROGRAM

## 2022-06-27 NOTE — PROGRESS NOTES
"Progress Note  [facility]      Chief Complaint:   Chief Complaint   Patient presents with    Gynecologic Exam        Subjective:    Gabrielle Chung is a 46 y.o.female with no pertinent history who is here for pap smear. She has no concerns today.   Review of Systems    Review of Systems   Constitutional: Negative for chills, fever and weight loss.   Eyes: Negative for blurred vision.   Respiratory: Negative for cough, hemoptysis, sputum production and shortness of breath.    Cardiovascular: Negative for chest pain, palpitations, orthopnea, claudication and leg swelling.   Gastrointestinal: Negative for abdominal pain, blood in stool, constipation, diarrhea, heartburn, melena, nausea and vomiting.   Genitourinary: Negative for dysuria, hematuria and urgency.   Skin: Negative for itching and rash.   Neurological: Negative for dizziness, tingling, weakness and headaches.   Psychiatric/Behavioral: Negative for depression, substance abuse and suicidal ideas. The patient is not nervous/anxious and does not have insomnia.         Past medical, past surgical, social, and family history reviewed.    Objective:    /76   Pulse 73   Ht 5' 1" (1.549 m)   Wt 55.5 kg (122 lb 5.7 oz)   BMI 23.12 kg/m²     Physical Exam:  Physical Exam  Vitals and nursing note reviewed. Exam conducted with a chaperone present.   Constitutional:       General: She is not in acute distress.     Appearance: She is not diaphoretic.   HENT:      Head: Normocephalic and atraumatic.   Cardiovascular:      Rate and Rhythm: Normal rate and regular rhythm.      Heart sounds: No murmur heard.    No friction rub. No gallop.   Pulmonary:      Effort: Pulmonary effort is normal. No respiratory distress.      Breath sounds: Normal breath sounds. No wheezing or rales.   Chest:      Chest wall: No tenderness.   Abdominal:      General: Bowel sounds are normal. There is no distension.      Palpations: Abdomen is soft.      Tenderness: There is no " abdominal tenderness.   Genitourinary:     General: Normal vulva.      Vagina: Normal.      Cervix: Normal and dilated.      Uterus: Normal.       Adnexa: Right adnexa normal and left adnexa normal.   Skin:     General: Skin is warm and dry.      Findings: No erythema.   Neurological:      Mental Status: She is alert and oriented to person, place, and time.   Psychiatric:         Mood and Affect: Mood and affect normal.         Cognition and Memory: Memory normal.         Judgment: Judgment normal.         Laboratory:    Reviewed labs and imaging.      Assessment Plan    1. Immunization deficiency  - Rubella antibody, IgG; Future  - Rubeola antibody IgG; Future  - Mumps, IgG Screen; Future  - Hepatitis B Surface Antibody, Qual/Quant; Future  - Varicella zoster antibody, IgG; Future    2. Menopause  - Follicle Stimulating Hormone; Future    3. Screening for cervical cancer  Pap smear performed      Follow Up:  Annually    The patient's diagnosis and medications were discussed.    I will review labs and notify patient with results either by mail or contact by phone.      06/27/2022  Brant Payan M.D.  [facility] PGY-3    *This note was dictated using the M*Modal Fluency Direct word recognition program. There are word recognition mistakes that are occasionally missed on review.

## 2022-06-30 NOTE — PROGRESS NOTES
I assume primary medical responsibility for this patient. I have reviewed the history, physical, and assessement & treatment plan with the resident and agree that the care is reasonable and necessary. This service has been performed by a resident without the presence of a teaching physician under the primary care exception. If necessary, an addendum of additional findings or evaluation beyond the resident documentation will be noted below.     Anahi Carrera MD

## 2023-05-17 ENCOUNTER — OFFICE VISIT (OUTPATIENT)
Dept: FAMILY MEDICINE | Facility: HOSPITAL | Age: 48
End: 2023-05-17
Payer: MEDICAID

## 2023-05-17 VITALS
HEART RATE: 59 BPM | BODY MASS INDEX: 21.15 KG/M2 | WEIGHT: 123.88 LBS | DIASTOLIC BLOOD PRESSURE: 82 MMHG | SYSTOLIC BLOOD PRESSURE: 128 MMHG | HEIGHT: 64 IN

## 2023-05-17 DIAGNOSIS — Z12.11 SCREENING FOR COLORECTAL CANCER: ICD-10-CM

## 2023-05-17 DIAGNOSIS — M54.50 LUMBAR BACK PAIN: Primary | ICD-10-CM

## 2023-05-17 DIAGNOSIS — Z12.39 ENCOUNTER FOR SCREENING FOR MALIGNANT NEOPLASM OF BREAST, UNSPECIFIED SCREENING MODALITY: ICD-10-CM

## 2023-05-17 DIAGNOSIS — Z12.12 SCREENING FOR COLORECTAL CANCER: ICD-10-CM

## 2023-05-17 PROCEDURE — 99213 OFFICE O/P EST LOW 20 MIN: CPT

## 2023-05-17 RX ORDER — LIDOCAINE 50 MG/G
1 PATCH TOPICAL DAILY
Qty: 30 PATCH | Refills: 0 | Status: SHIPPED | OUTPATIENT
Start: 2023-05-17

## 2023-05-17 RX ORDER — DICLOFENAC SODIUM 10 MG/G
2 GEL TOPICAL 4 TIMES DAILY
Qty: 200 G | Refills: 1 | Status: SHIPPED | OUTPATIENT
Start: 2023-05-17
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
## 2023-05-18 NOTE — PROGRESS NOTES
I have reviewed the notes, assessments, and/or procedures performed by Dr. Tamez, I concur with her/his documentation of Gabrielle Chung. Back pain without radiculopathy. PT. Health maintenance addressed

## 2023-05-19 ENCOUNTER — CLINICAL SUPPORT (OUTPATIENT)
Dept: REHABILITATION | Facility: HOSPITAL | Age: 48
End: 2023-05-19
Payer: MEDICAID

## 2023-05-19 DIAGNOSIS — M25.651 DECREASED RANGE OF MOTION OF BOTH HIPS: ICD-10-CM

## 2023-05-19 DIAGNOSIS — M62.81 MUSCLE WEAKNESS OF LOWER EXTREMITY: ICD-10-CM

## 2023-05-19 DIAGNOSIS — M25.652 DECREASED RANGE OF MOTION OF BOTH HIPS: ICD-10-CM

## 2023-05-19 DIAGNOSIS — M54.50 LUMBAR BACK PAIN: ICD-10-CM

## 2023-05-19 DIAGNOSIS — Z74.09 IMPAIRED FUNCTIONAL MOBILITY AND ACTIVITY TOLERANCE: ICD-10-CM

## 2023-05-19 PROCEDURE — 97162 PT EVAL MOD COMPLEX 30 MIN: CPT | Mod: PN

## 2023-05-19 PROCEDURE — 97530 THERAPEUTIC ACTIVITIES: CPT | Mod: PN
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
## 2023-05-23 ENCOUNTER — CLINICAL SUPPORT (OUTPATIENT)
Dept: REHABILITATION | Facility: HOSPITAL | Age: 48
End: 2023-05-23
Payer: MEDICAID

## 2023-05-23 DIAGNOSIS — M62.81 MUSCLE WEAKNESS OF LOWER EXTREMITY: ICD-10-CM

## 2023-05-23 DIAGNOSIS — Z74.09 IMPAIRED FUNCTIONAL MOBILITY AND ACTIVITY TOLERANCE: Primary | ICD-10-CM

## 2023-05-23 DIAGNOSIS — M25.652 DECREASED RANGE OF MOTION OF BOTH HIPS: ICD-10-CM

## 2023-05-23 DIAGNOSIS — M25.651 DECREASED RANGE OF MOTION OF BOTH HIPS: ICD-10-CM

## 2023-05-23 PROCEDURE — 97110 THERAPEUTIC EXERCISES: CPT | Mod: PN,CQ

## 2023-05-23 NOTE — PROGRESS NOTES
"[facility]   Physical Therapy Treatment Note      Name: Gabrielle Chung  Clinic Number: [id number]    Therapy Diagnosis:   Encounter Diagnoses   Name Primary?    Impaired functional mobility and activity tolerance Yes    Muscle weakness of lower extremity     Decreased range of motion of both hips      Physician: Stefano Tamez MD    Visit Date: 5/23/2023    Encounter Diagnosis   Name Primary?    Lumbar back pain           Physician: Stefano Tamez MD     Physician Orders: PT Eval and Treat   Medical Diagnosis from Referral: M54.50 (ICD-10-CM) - Lumbar back pain  Evaluation Date: 5/19/2023  Authorization Period Expiration: 12/31/2023  Plan of Care Expiration: 6/30/2023  Progress Note Due: 6/9/2023  Visit # / Visits authorized: 2/ pending    FOTO: 2/5     Precautions: Standard     PTA Visit #: 1/5     Time In: 11:00 am  Time Out: 11:55 am  Total Billable Time: 55 minutes    Subjective     Pt reports: feels a little better since she is doing the home exercise program. She sleeps on her stomach and that also helps to decrease the pain .  She was compliant with home exercise program.  Response to previous treatment: less pain  Functional change: Ongoing    Pain: 5/10  Location: bilateral buttocks      Objective      Objective Measures updated at progress report unless specified.     Treatment     Gabrielle received the treatments listed below:      therapeutic exercises to develop strength, endurance, ROM, flexibility, posture, and core stabilization for 50 minutes including:  Prone with forehead on rolled towel 2 minutes with breathing (reduced pain, but attempted gluteal squeezes increased pain so deferred)  Hook lying Transverse abdominus x15 reps 5 sec holds  Brace marching 2 trials of 1 minute   Hook lying ADDuctor isometric 15x5: holds with sm ball  Hook lying ADBuctor isometric 15x5" holds with belt around knees  LTRs: 10x  piriformis stretch: 2x30"  Crossed leg hip internal rotation: " "2x30"  Hamstring stretch 3x30" bilateral with strap  Edgar position quad stretch 3x30" bilateral          manual therapy techniques: Manual traction, Myofacial release, Soft tissue Mobilization, and Friction Massage were applied to the: bilateral lower extremity quadrants  for 5 minutes, including:  Muscle energy technique     neuromuscular re-education activities to improve: Kinesthetic, Sense, Proprioception, and Posture for 0 minutes. The following activities were included:  NP     therapeutic activities to improve functional performance for 0  minutes, including:  NP         Patient Education and Home Exercises       Education provided:   - home exercise program     Written Home Exercises Provided: Patient instructed to cont prior HEP. Exercises were reviewed and Gabrielle was able to demonstrate them prior to the end of the session.  Gabrielle demonstrated good  understanding of the education provided. See EMR under Patient Instructions for exercises provided during therapy sessions    Assessment     Gabrielle presents for her first follow up with continued bilateral piriformis pain right > left. She reports the exercises help  and she has less pain now than at the time of evaluation. Exhibits pelvic asymmetry right up slip innominate. This was  Approximated with muscle energy technique and returned her to pelvic symmetry. Following today's interventions she exited clinic with decreased young to 2/10.     Gabrielle Is progressing well towards her goals.   Pt prognosis is Good.     Pt will continue to benefit from skilled outpatient physical therapy to address the deficits listed in the problem list box on initial evaluation, provide pt/family education and to maximize pt's level of independence in the home and community environment.     Pt's spiritual, cultural and educational needs considered and pt agreeable to plan of care and goals.     Anticipated barriers to physical therapy: chronicity     Goals:   Short Term Goals (3 " Weeks):  1. Patient will be compliant with home exercise program to supplement therapy in promoting functional mobility.  2. Patient will reports </= 5/10 pain at rest to improve quality of life.  3. Patient will report no pain during thoracolumbar active range of motion to promote functional mobility.  4. Patient will improve impaired lower extremity myotomes/manual muscle tests  to >/=4/5 to improve strength for functional tasks.     Long Term Goals (6 Weeks):   1. Patient will improve FOTO score to </= 39% limited to decrease perceived limitation with maintaining/changing body position.   2. Patient will perform transverse abdominus contraction with good control to demonstrate improved core strength.  3. Patient will improve impaired lower extremity myotomes/manual muscle tests to >/=4+/5 to improve strength for functional tasks.  4. Patient will improve bilateral hip external rotation AROM to 30 degrees to improve mobility for functional activities.  5. Patient will be able to return to free-weight exercising at the gym to return to prior level of function.  6. Patient will reports </= 1/10 pain at rest to improve quality of life.  Plan     Continue PT plan of care     Enmanuel Butler, LILLIAN

## 2023-05-26 ENCOUNTER — CLINICAL SUPPORT (OUTPATIENT)
Dept: REHABILITATION | Facility: HOSPITAL | Age: 48
End: 2023-05-26
Payer: MEDICAID

## 2023-05-26 DIAGNOSIS — Z74.09 IMPAIRED FUNCTIONAL MOBILITY AND ACTIVITY TOLERANCE: Primary | ICD-10-CM

## 2023-05-26 DIAGNOSIS — M25.651 DECREASED RANGE OF MOTION OF BOTH HIPS: ICD-10-CM

## 2023-05-26 DIAGNOSIS — M25.652 DECREASED RANGE OF MOTION OF BOTH HIPS: ICD-10-CM

## 2023-05-26 DIAGNOSIS — M62.81 MUSCLE WEAKNESS OF LOWER EXTREMITY: ICD-10-CM

## 2023-05-26 PROCEDURE — 97110 THERAPEUTIC EXERCISES: CPT | Mod: PN,CQ

## 2023-05-26 NOTE — PROGRESS NOTES
"[facility]   Physical Therapy Treatment Note      Name: Gabrielle Chung  Clinic Number: [id number]    Therapy Diagnosis:   Encounter Diagnoses   Name Primary?    Impaired functional mobility and activity tolerance Yes    Muscle weakness of lower extremity     Decreased range of motion of both hips          Physician: No ref. provider found    Visit Date: 5/26/2023    Encounter Diagnosis   Name Primary?    Lumbar back pain           Physician: Stefano Tamez MD     Physician Orders: PT Eval and Treat   Medical Diagnosis from Referral: M54.50 (ICD-10-CM) - Lumbar back pain  Evaluation Date: 5/19/2023  Authorization Period Expiration: 12/31/2023  Plan of Care Expiration: 6/30/2023  Progress Note Due: 6/9/2023  Visit # / Visits authorized: 3/ pending    FOTO: 3/5     Precautions: Standard     PTA Visit #: 2/5     Time In: 0815   Time Out: 0900  Total Billable Time: 45 minutes    Subjective     Pt reports: feels much better since last visit. She is very surprised that she tolerated her hard day at work yesterday relatively pain free.  She is late today because she hd to meet and an .  She was compliant with home exercise program.  Response to previous treatment: less pain  Functional change: Improved functional mobility    Pain: 2/10  Location: bilateral buttocks      Objective      Right up slip pelvic innominate     Treatment     Gabrielle received the treatments listed below:      therapeutic exercises to develop strength, endurance, ROM, flexibility, posture, and core stabilization for 45 minutes including:  Prone with forehead on rolled towel 2 minutes with breathing (reduced pain, but attempted gluteal squeezes increased pain so deferred)  Hook lying Transverse abdominus x15 reps 5 sec holds  Brace marching 2 trials of 1 minute   Hook lying ADDuctor isometric 15x5: holds with sm ball  Hook lying ADBuctor isometric 15x5" holds with belt around knees  Side lying reverse clam shells " "2x10   Side lying clamshells 2x10  LTRs: 10x  piriformis stretch: 2x30"  Crossed leg hip internal rotation: 2x30"  Hamstring stretch 3x30" bilateral with strap  Edgar position quad stretch 3x30" bilateral          manual therapy techniques: Manual traction, Myofacial release, Soft tissue Mobilization, and Friction Massage were applied to the: bilateral lower extremity quadrants  for 5 minutes, including:  Muscle energy technique     neuromuscular re-education activities to improve: Kinesthetic, Sense, Proprioception, and Posture for 0 minutes. The following activities were included:  NP     therapeutic activities to improve functional performance for 0  minutes, including:  NP         Patient Education and Home Exercises       Education provided:   - home exercise program   -How to perform muscle energy technique to correct pelvic asymmetry     Written Home Exercises Provided: Patient instructed to cont prior HEP. Exercises were reviewed and Gabrielle was able to demonstrate them prior to the end of the session.  Gabrielle demonstrated good  understanding of the education provided. See EMR under Patient Instructions for exercises provided during therapy sessions    Assessment     Gabrielle presents  2nd follow up with much less pain 2/10. And reports improved tolerance to work challenges. She has been doing her home exercise program. Exhibits pelvic asymmetry right up slip innominate. This was approximated with muscle energy technique and returned her to pelvic symmetry. Following today's interventions she exited clinic with decreased pina to 0/10.     Gabrielle Is progressing well towards her goals.   Pt prognosis is Good.     Pt will continue to benefit from skilled outpatient physical therapy to address the deficits listed in the problem list box on initial evaluation, provide pt/family education and to maximize pt's level of independence in the home and community environment.     Pt's spiritual, cultural and educational needs " considered and pt agreeable to plan of care and goals.     Anticipated barriers to physical therapy: chronicity     Goals:   Short Term Goals (3 Weeks):  1. Patient will be compliant with home exercise program to supplement therapy in promoting functional mobility.  2. Patient will reports </= 5/10 pain at rest to improve quality of life.  3. Patient will report no pain during thoracolumbar active range of motion to promote functional mobility.  4. Patient will improve impaired lower extremity myotomes/manual muscle tests  to >/=4/5 to improve strength for functional tasks.     Long Term Goals (6 Weeks):   1. Patient will improve FOTO score to </= 39% limited to decrease perceived limitation with maintaining/changing body position.   2. Patient will perform transverse abdominus contraction with good control to demonstrate improved core strength.  3. Patient will improve impaired lower extremity myotomes/manual muscle tests to >/=4+/5 to improve strength for functional tasks.  4. Patient will improve bilateral hip external rotation AROM to 30 degrees to improve mobility for functional activities.  5. Patient will be able to return to free-weight exercising at the gym to return to prior level of function.  6. Patient will reports </= 1/10 pain at rest to improve quality of life.  Plan     Continue PT plan of care     Enmanuel Butler, PTA

## 2023-05-31 ENCOUNTER — CLINICAL SUPPORT (OUTPATIENT)
Dept: REHABILITATION | Facility: HOSPITAL | Age: 48
End: 2023-05-31
Payer: MEDICAID

## 2023-05-31 DIAGNOSIS — M62.81 MUSCLE WEAKNESS OF LOWER EXTREMITY: ICD-10-CM

## 2023-05-31 DIAGNOSIS — M25.651 DECREASED RANGE OF MOTION OF BOTH HIPS: ICD-10-CM

## 2023-05-31 DIAGNOSIS — M25.652 DECREASED RANGE OF MOTION OF BOTH HIPS: ICD-10-CM

## 2023-05-31 DIAGNOSIS — Z74.09 IMPAIRED FUNCTIONAL MOBILITY AND ACTIVITY TOLERANCE: Primary | ICD-10-CM

## 2023-05-31 PROCEDURE — 97110 THERAPEUTIC EXERCISES: CPT | Mod: PN

## 2023-05-31 NOTE — PROGRESS NOTES
"[facility]   Physical Therapy Treatment Note      Name: Gabrielle Chung  Clinic Number: [id number]    Therapy Diagnosis:   Encounter Diagnoses   Name Primary?    Impaired functional mobility and activity tolerance Yes    Muscle weakness of lower extremity     Decreased range of motion of both hips      Physician: Stefano Tamez MD    Visit Date: 5/31/2023    Physician Orders: PT Eval and Treat   Medical Diagnosis from Referral: M54.50 (ICD-10-CM) - Lumbar back pain  Evaluation Date: 5/19/2023  Authorization Period Expiration: 12/31/2023  Plan of Care Expiration: 6/30/2023  Progress Note Due: 6/9/2023  Visit # / Visits authorized: 3/20  FOTO: 3/5     Precautions: Standard     PTA Visit #: 2/5     Time In: 8:03 am   Time Out: 8:50 am (Patient requested to leave early as she returned from out of town late last night)  Total Billable Time: 47 minutes    Subjective     Pt reports: pain levels fluctuating daily. Some days will be fine and sometimes she will get sudden sharp pain that can last a few hours.     She was compliant with home exercise program.  Response to previous treatment: less pain  Functional change: improved functional mobility; more tolerance to work activities    Pain: 2/10  Location: bilateral buttocks      Objective      5/31/2023  Right anterior innominate rotation (+)    Treatment     Gabrielle received the treatments listed below:      therapeutic exercises to develop strength, endurance, ROM, flexibility, posture, and core stabilization for 42 minutes including:    Bridges with hip abduction in blue belt: 2x10  Transverse abdominus contraction with ADDuctor isometric 15x5: holds with sm ball  Transverse abdominus contraction with ADBuctor isometric 15x5" holds with belt around knees  Transverse abdominus contraction 90-90 table top with alternating heel drop: 10x   Dead bug: 15x   Side lying reverse clam shells: 2x10   Side lying clamshells: green - 2x10  Prone hip extension: " "2x10; 2# ankle weight 10x bilateral  Prone hip extension with knee flexion to 90 degrees: 2#  - 3x10 bilateral  Piriformis stretch: 2x30"  Standing hip flexor stretch at 2nd step: 5x20" bilateral    OOT:  Prone with forehead on rolled towel 2 minutes with breathing (reduced pain, but attempted gluteal squeezes increased pain so deferred)  Crossed leg hip internal rotation: 2x30"  Hamstring stretch 3x30" bilateral with strap  Edgar position quad stretch 3x30" bilateral     manual therapy techniques: Manual traction, Myofacial release, Soft tissue Mobilization, and Friction Massage were applied to the: bilateral lower extremity quadrants  for 5 minutes, including:  Hip abduction/adduction METs (6x3")  METs with dowel - extension with right lower extremity and flexion left lower extremity (10x5")    neuromuscular re-education activities to improve: Kinesthetic, Sense, Proprioception, and Posture for 0 minutes. The following activities were included:  NP     therapeutic activities to improve functional performance for 0  minutes, including:  NP         Patient Education and Home Exercises       Education provided:   -updated HEP    Written Home Exercises Provided: Patient instructed to cont prior HEP. Exercises were reviewed and Gabrielle was able to demonstrate them prior to the end of the session.  Gabrielle demonstrated good  understanding of the education provided. See EMR under Patient Instructions for exercises provided during therapy sessions    Assessment   Gabrielle is a 47 y.o. female referred to outpatient Physical Therapy with a medical diagnosis of M54.50 (ICD-10-CM) - Lumbar back pain. Patient presents with signs and symptoms consistent with piriformis syndrome. Continued right anterior innominate rotation with resolution following muscle energy techniques. Responding well to muscle energy techniques, core stability program, and gluteal strengthening. Added higher level core and glute medius/chago strengthening " with good tolerance. Added hip flexor stretch at step for hip extension mobility. Verbalized 0/10 pain end of session. Provided updated home exercise program.    Gabrielle Is progressing well towards her goals.   Pt prognosis is Good.     Pt will continue to benefit from skilled outpatient physical therapy to address the deficits listed in the problem list box on initial evaluation, provide pt/family education and to maximize pt's level of independence in the home and community environment.     Pt's spiritual, cultural and educational needs considered and pt agreeable to plan of care and goals.     Anticipated barriers to physical therapy: chronicity     Goals:   Short Term Goals (3 Weeks):  1. Patient will be compliant with home exercise program to supplement therapy in promoting functional mobility.  2. Patient will reports </= 5/10 pain at rest to improve quality of life.  3. Patient will report no pain during thoracolumbar active range of motion to promote functional mobility.  4. Patient will improve impaired lower extremity myotomes/manual muscle tests  to >/=4/5 to improve strength for functional tasks.     Long Term Goals (6 Weeks):   1. Patient will improve FOTO score to </= 39% limited to decrease perceived limitation with maintaining/changing body position.   2. Patient will perform transverse abdominus contraction with good control to demonstrate improved core strength.  3. Patient will improve impaired lower extremity myotomes/manual muscle tests to >/=4+/5 to improve strength for functional tasks.  4. Patient will improve bilateral hip external rotation AROM to 30 degrees to improve mobility for functional activities.  5. Patient will be able to return to free-weight exercising at the gym to return to prior level of function.  6. Patient will reports </= 1/10 pain at rest to improve quality of life.  Plan   Monitor innominate symmetry   Hip mobility -- particularly hip extension and ER  core stability  program  Glute max/medius strengthening     Hollie Mcdonald, PT           car seat

## 2023-06-02 ENCOUNTER — CLINICAL SUPPORT (OUTPATIENT)
Dept: REHABILITATION | Facility: HOSPITAL | Age: 48
End: 2023-06-02
Payer: MEDICAID

## 2023-06-02 DIAGNOSIS — M25.652 DECREASED RANGE OF MOTION OF BOTH HIPS: ICD-10-CM

## 2023-06-02 DIAGNOSIS — M62.81 MUSCLE WEAKNESS OF LOWER EXTREMITY: ICD-10-CM

## 2023-06-02 DIAGNOSIS — M25.651 DECREASED RANGE OF MOTION OF BOTH HIPS: ICD-10-CM

## 2023-06-02 DIAGNOSIS — Z74.09 IMPAIRED FUNCTIONAL MOBILITY AND ACTIVITY TOLERANCE: Primary | ICD-10-CM

## 2023-06-02 PROCEDURE — 97110 THERAPEUTIC EXERCISES: CPT | Mod: PN

## 2023-06-02 NOTE — PROGRESS NOTES
"[facility]   Physical Therapy Treatment Note      Name: Gabrielle Chung  Clinic Number: [id number]    Therapy Diagnosis:   Encounter Diagnoses   Name Primary?    Impaired functional mobility and activity tolerance Yes    Muscle weakness of lower extremity     Decreased range of motion of both hips      Physician: Stefano Tamez MD    Visit Date: 6/2/2023    Physician Orders: PT Eval and Treat   Medical Diagnosis from Referral: M54.50 (ICD-10-CM) - Lumbar back pain  Evaluation Date: 5/19/2023  Authorization Period Expiration: 12/31/2023  Plan of Care Expiration: 6/30/2023  Progress Note Due: 6/9/2023  Visit # / Visits authorized: 4/20  FOTO: 4/5     Precautions: Standard     PTA Visit #: 2/5     Time In: 9:07 am   Time Out: 10:00 am  Total Billable Time: 53 minutes    Subjective     Pt reports: had a tough day at work, 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 No episodes of sharp pain since the past weekend.     She was compliant with home exercise program.  Response to previous treatment: muscle burn and fatigue; decreased pain   Functional change: improved functional mobility; more tolerance to work activities    Pain: 2/10  Location: bilateral buttocks      Objective      5/31/2023  Right anterior innominate rotation (+)    Treatment     Gabrielle received the treatments listed below:      therapeutic exercises to develop strength, endurance, ROM, flexibility, posture, and core stabilization for 42 minutes including:    Bridges with hip abduction in blue belt: 2x10  Prone quad stretch: 2x30" right   Prone extension to elbows: 10x (no change in symptoms during, but increased pain following)  Prone hip extension with knee flexion to 90 degrees: 2#  - 2x10 bilateral  Prone hip extension: 2x10 2# ankle weight  Dead bug: 15x   Transverse abdominus contraction 90-90 table top with alternating heel drop: 2x10   Swiss ball roll outs: 2x15 " "(reduced symptoms)  Seated figure 4 piriformis stretch: 2x30"  Step up with knee drive: 6" step with 5# dumbbell - 2x10 bilateral  Half kneeling hip flexor stretch with trunk rotation: 5x10" bilateral    OOT:  Crossed leg hip internal rotation: 3x30" left   Transverse abdominus contraction with ADDuctor isometric 15x5: holds with sm ball  Transverse abdominus contraction with ADBuctor isometric 15x5" holds with belt around knees  Side lying reverse clam shells: 2x10   Side lying clamshells: green - 2x10    manual therapy techniques: Manual traction, Myofacial release, Soft tissue Mobilization, and Friction Massage were applied to the: bilateral lower extremity quadrants  for 15 minutes, including:  METs with dowel - extension with right lower extremity and flexion left lower extremity (10x5")  L3-L5  - grade II-III  Right hip PA mobilizations    neuromuscular re-education activities to improve: Kinesthetic, Sense, Proprioception, and Posture for 0 minutes. The following activities were included:  NP     therapeutic activities to improve functional performance for 0  minutes, including:  NP         Patient Education and Home Exercises       Education provided:   -updated HEP    Written Home Exercises Provided: Patient instructed to cont prior HEP. Exercises were reviewed and Gabrielle was able to demonstrate them prior to the end of the session.  Gabrielle demonstrated good  understanding of the education provided. See EMR under Patient Instructions for exercises provided during therapy sessions    Assessment   Gabrielle is a 47 y.o. female referred to outpatient Physical Therapy with a medical diagnosis of M54.50 (ICD-10-CM) - Lumbar back pain. Patient presents with signs and symptoms consistent with piriformis syndrome. Added gluteal activation and strengthening in bold. Added some manual techniques to lumbar spine but some increase in pain levels - will hold next. Will educate on hip hinging technique next " visit.      Gabrielle Is progressing well towards her goals.   Pt prognosis is Good.     Pt will continue to benefit from skilled outpatient physical therapy to address the deficits listed in the problem list box on initial evaluation, provide pt/family education and to maximize pt's level of independence in the home and community environment.     Pt's spiritual, cultural and educational needs considered and pt agreeable to plan of care and goals.     Anticipated barriers to physical therapy: chronicity     Goals:   Short Term Goals (3 Weeks):  1. Patient will be compliant with home exercise program to supplement therapy in promoting functional mobility.  2. Patient will reports </= 5/10 pain at rest to improve quality of life.  3. Patient will report no pain during thoracolumbar active range of motion to promote functional mobility.  4. Patient will improve impaired lower extremity myotomes/manual muscle tests  to >/=4/5 to improve strength for functional tasks.     Long Term Goals (6 Weeks):   1. Patient will improve FOTO score to </= 39% limited to decrease perceived limitation with maintaining/changing body position.   2. Patient will perform transverse abdominus contraction with good control to demonstrate improved core strength.  3. Patient will improve impaired lower extremity myotomes/manual muscle tests to >/=4+/5 to improve strength for functional tasks.  4. Patient will improve bilateral hip external rotation AROM to 30 degrees to improve mobility for functional activities.  5. Patient will be able to return to free-weight exercising at the gym to return to prior level of function.  6. Patient will reports </= 1/10 pain at rest to improve quality of life.  Plan   Monitor innominate symmetry   Hip mobility -- particularly hip extension and ER  core stability program  Glute max/medius strengthening     Hollie Mcdonald, PT

## 2023-06-06 ENCOUNTER — CLINICAL SUPPORT (OUTPATIENT)
Dept: REHABILITATION | Facility: HOSPITAL | Age: 48
End: 2023-06-06
Payer: MEDICAID

## 2023-06-06 DIAGNOSIS — M25.651 DECREASED RANGE OF MOTION OF BOTH HIPS: ICD-10-CM

## 2023-06-06 DIAGNOSIS — Z74.09 IMPAIRED FUNCTIONAL MOBILITY AND ACTIVITY TOLERANCE: Primary | ICD-10-CM

## 2023-06-06 DIAGNOSIS — M62.81 MUSCLE WEAKNESS OF LOWER EXTREMITY: ICD-10-CM

## 2023-06-06 DIAGNOSIS — M25.652 DECREASED RANGE OF MOTION OF BOTH HIPS: ICD-10-CM

## 2023-06-06 PROCEDURE — 97110 THERAPEUTIC EXERCISES: CPT | Mod: PN

## 2023-06-06 NOTE — PROGRESS NOTES
"[facility]   Physical Therapy Treatment Note      Name: Gabrielle Chung  Clinic Number: [id number]    Therapy Diagnosis:   Encounter Diagnoses   Name Primary?    Impaired functional mobility and activity tolerance Yes    Muscle weakness of lower extremity     Decreased range of motion of both hips      Physician: Stefano Tamez MD    Visit Date: 6/6/2023    Physician Orders: PT Eval and Treat   Medical Diagnosis from Referral: M54.50 (ICD-10-CM) - Lumbar back pain  Evaluation Date: 5/19/2023  Authorization Period Expiration: 12/31/2023  Plan of Care Expiration: 6/30/2023  Progress Note Due: 6/9/2023  Visit # / Visits authorized: 5/20  FOTO: Next at discharge      Precautions: Standard     PTA Visit #: 2/5     Time In: 7:10 am   Time Out: 8:00  am  Total Billable Time: 50 minutes    Subjective     Pt reports: she had been feeling a lot better. Low grade pain at 1/10 today and over the weekend.    She was compliant with home exercise program.  Response to previous treatment: muscle burn and fatigue; decreased pain   Functional change: improved functional mobility; more tolerance to work activities    Pain: 1/10  Location: bilateral buttocks      Objective      5/31/2023  Right anterior innominate rotation (+)    Treatment     Gabrielle received the treatments listed below:      therapeutic exercises to develop strength, endurance, ROM, flexibility, posture, and core stabilization for 55 minutes including:    Transverse abdominus contraction 90-90 table top with alternating heel drop: 2x10   Dead bug: 2x10  Supine transverse abdominus contraction with straight arm pull down with alternating straight leg raise: 10x  Bridges with hip abduction in blue belt: 2x10  Single leg bridges: 2x10 each  Step up with knee drive: 6" step with 5# dumbbell - 2x10 bilateral  Half kneeling hip flexor stretch with trunk rotation: 5x10" bilateral  Bird Dog: 10x  Hip hinge: 15x  Dead lift: 12# kettle bell - 2x10 to " "stool      OOT:  Crossed leg hip internal rotation: 3x30" left   Transverse abdominus contraction with ADDuctor isometric 15x5: holds with sm ball  Transverse abdominus contraction with ADBuctor isometric 15x5" holds with belt around knees  Side lying reverse clam shells: 2x10   Side lying clamshells: green - 2x10    manual therapy techniques: Manual traction, Myofacial release, Soft tissue Mobilization, and Friction Massage were applied to the: bilateral lower extremity quadrants  for 00 minutes, including:  METs with dowel - extension with right lower extremity and flexion left lower extremity (10x5")  L3-L5  - grade II-III  Right hip PA mobilizations    neuromuscular re-education activities to improve: Kinesthetic, Sense, Proprioception, and Posture for 0 minutes. The following activities were included:  NP     therapeutic activities to improve functional performance for 0  minutes, including:  NP         Patient Education and Home Exercises       Education provided:   -updated HEP    Written Home Exercises Provided: Patient instructed to cont prior HEP. Exercises were reviewed and Gabrielle was able to demonstrate them prior to the end of the session.  Gabrielle demonstrated good  understanding of the education provided. See EMR under Patient Instructions for exercises provided during therapy sessions    Assessment   Gabrielle is a 47 y.o. female referred to outpatient Physical Therapy with a medical diagnosis of M54.50 (ICD-10-CM) - Lumbar back pain. Patient presents with signs and symptoms consistent with piriformis syndrome. Initiated hip hinging technique to promote proper lifting and carrying mechanics at home. Progressed core stability program and gluteus medius/chago strengthening with excellent tolerance. Will monitor response next visit and progress towards rotational core stability training.      Gabrielle Is progressing well towards her goals.   Pt prognosis is Good.     Pt will continue to benefit from skilled " outpatient physical therapy to address the deficits listed in the problem list box on initial evaluation, provide pt/family education and to maximize pt's level of independence in the home and community environment.     Pt's spiritual, cultural and educational needs considered and pt agreeable to plan of care and goals.     Anticipated barriers to physical therapy: chronicity     Goals:   Short Term Goals (3 Weeks):  1. Patient will be compliant with home exercise program to supplement therapy in promoting functional mobility.  2. Patient will reports </= 5/10 pain at rest to improve quality of life.  3. Patient will report no pain during thoracolumbar active range of motion to promote functional mobility.  4. Patient will improve impaired lower extremity myotomes/manual muscle tests  to >/=4/5 to improve strength for functional tasks.     Long Term Goals (6 Weeks):   1. Patient will improve FOTO score to </= 39% limited to decrease perceived limitation with maintaining/changing body position.   2. Patient will perform transverse abdominus contraction with good control to demonstrate improved core strength.  3. Patient will improve impaired lower extremity myotomes/manual muscle tests to >/=4+/5 to improve strength for functional tasks.  4. Patient will improve bilateral hip external rotation AROM to 30 degrees to improve mobility for functional activities.  5. Patient will be able to return to free-weight exercising at the gym to return to prior level of function.  6. Patient will reports </= 1/10 pain at rest to improve quality of life.  Plan   Monitor innominate symmetry   Hip mobility -- particularly hip extension and ER  core stability program  Glute max/medius strengthening     Hollie Mcdonald, PT

## 2023-06-09 LAB — NONINV COLON CA DNA+OCC BLD SCRN STL QL: NEGATIVE

## 2023-06-13 ENCOUNTER — CLINICAL SUPPORT (OUTPATIENT)
Dept: REHABILITATION | Facility: HOSPITAL | Age: 48
End: 2023-06-13
Payer: MEDICAID

## 2023-06-13 DIAGNOSIS — M25.651 DECREASED RANGE OF MOTION OF BOTH HIPS: ICD-10-CM

## 2023-06-13 DIAGNOSIS — Z74.09 IMPAIRED FUNCTIONAL MOBILITY AND ACTIVITY TOLERANCE: Primary | ICD-10-CM

## 2023-06-13 DIAGNOSIS — M25.652 DECREASED RANGE OF MOTION OF BOTH HIPS: ICD-10-CM

## 2023-06-13 DIAGNOSIS — M62.81 MUSCLE WEAKNESS OF LOWER EXTREMITY: ICD-10-CM

## 2023-06-13 PROCEDURE — 97110 THERAPEUTIC EXERCISES: CPT | Mod: PN

## 2023-06-13 NOTE — PROGRESS NOTES
"[facility]   Physical Therapy Treatment Note      Name: Gabrielle Chung  Clinic Number: [id number]    Therapy Diagnosis:   Encounter Diagnoses   Name Primary?    Impaired functional mobility and activity tolerance Yes    Muscle weakness of lower extremity     Decreased range of motion of both hips      Physician: Stefano Tamez MD    Visit Date: 6/13/2023    Physician Orders: PT Eval and Treat   Medical Diagnosis from Referral: M54.50 (ICD-10-CM) - Lumbar back pain  Evaluation Date: 5/19/2023  Authorization Period Expiration: 12/31/2023  Plan of Care Expiration: 6/30/2023  Progress Note Due: 6/9/2023  Visit # / Visits authorized: 5/20  FOTO: Next at discharge      Precautions: Standard     PTA Visit #: 2/5     Time In: 10:00 am   Time Out: 10:40 am (Patient requested to leave early - did not feel well)  Total Billable Time: 40 minutes    Subjective     Pt reports: presents with increased left hip pain beginning in left SIJ and radiating into anterior hip.     She was compliant with home exercise program.  Response to previous treatment: muscle burn and fatigue; decreased pain   Functional change: improved functional mobility; more tolerance to work activities    Pain: 2-3/10  Location: bilateral buttocks      Objective      5/31/2023  Right anterior innominate rotation (+)    Treatment     Gabrielle received the treatments listed below:      therapeutic exercises to develop strength, endurance, ROM, flexibility, posture, and core stabilization for 32 minutes including:    Transverse abdominus contraction with ADDuctor isometric 15x5: holds with sm ball  Transverse abdominus contraction with ADBuctor isometric 15x5" holds with belt around knees  Transverse abdominus contraction 90-90 table top with alternating heel drop: 2x10   Dead bug: 2x10  Supine transverse abdominus contraction with straight arm pull down with alternating straight leg raise: 10x  Bridges with hip abduction in blue belt: " "2x10  Single leg bridges: 2x10 each  Step up with knee drive: 6" step with 5# dumbbell - 2x10 bilateral  Half kneeling hip flexor stretch with trunk rotation: 5x10" bilateral  Bird Dog: 10x  Hip hinge: 15x  Dead lift: 12# nino bell - 2x10 to stool      OOT:  Crossed leg hip internal rotation: 3x30" left   Side lying reverse clam shells: 2x10   Side lying clamshells: green - 2x10    manual therapy techniques: Manual traction, Myofacial release, Soft tissue Mobilization, and Friction Massage were applied to the: bilateral lower extremity quadrants  for 8 minutes, including:  Right hip lateral axis distraction  Right hip long axis traction    neuromuscular re-education activities to improve: Kinesthetic, Sense, Proprioception, and Posture for 0 minutes. The following activities were included:  NP     therapeutic activities to improve functional performance for 0  minutes, including:  NP         Patient Education and Home Exercises       Education provided:   -updated HEP    Written Home Exercises Provided: Patient instructed to cont prior HEP. Exercises were reviewed and Gabrielle was able to demonstrate them prior to the end of the session.  Gabrielle demonstrated good  understanding of the education provided. See EMR under Patient Instructions for exercises provided during therapy sessions    Assessment   Gabrielle is a 47 y.o. female referred to outpatient Physical Therapy with a medical diagnosis of M54.50 (ICD-10-CM) - Lumbar back pain. Patient presents with signs and symptoms consistent with piriformis syndrome. Patient presents with increased left hip pain beginning in left SIJ and radiating into anterior hip. Improved with lateral and long-axis hip distraction. Continued with same therex as patient was not feeling well following covid-19 vaccination and requested to leave early. Will progress to more standing exercises versus mat in upcoming visits.      Gabrielle Is progressing well towards her goals.   Pt prognosis is " Good.     Pt will continue to benefit from skilled outpatient physical therapy to address the deficits listed in the problem list box on initial evaluation, provide pt/family education and to maximize pt's level of independence in the home and community environment.     Pt's spiritual, cultural and educational needs considered and pt agreeable to plan of care and goals.     Anticipated barriers to physical therapy: chronicity     Goals:   Short Term Goals (3 Weeks):  1. Patient will be compliant with home exercise program to supplement therapy in promoting functional mobility.  2. Patient will reports </= 5/10 pain at rest to improve quality of life.  3. Patient will report no pain during thoracolumbar active range of motion to promote functional mobility.  4. Patient will improve impaired lower extremity myotomes/manual muscle tests  to >/=4/5 to improve strength for functional tasks.     Long Term Goals (6 Weeks):   1. Patient will improve FOTO score to </= 39% limited to decrease perceived limitation with maintaining/changing body position.   2. Patient will perform transverse abdominus contraction with good control to demonstrate improved core strength.  3. Patient will improve impaired lower extremity myotomes/manual muscle tests to >/=4+/5 to improve strength for functional tasks.  4. Patient will improve bilateral hip external rotation AROM to 30 degrees to improve mobility for functional activities.  5. Patient will be able to return to free-weight exercising at the gym to return to prior level of function.  6. Patient will reports </= 1/10 pain at rest to improve quality of life.  Plan   Monitor innominate symmetry   Hip mobility -- particularly hip extension and ER  core stability program  Glute max/medius strengthening   Progress from mat to standing jenae Mcdonald, PT

## 2023-06-15 ENCOUNTER — CLINICAL SUPPORT (OUTPATIENT)
Dept: REHABILITATION | Facility: HOSPITAL | Age: 48
End: 2023-06-15
Payer: MEDICAID

## 2023-06-15 DIAGNOSIS — Z74.09 IMPAIRED FUNCTIONAL MOBILITY AND ACTIVITY TOLERANCE: Primary | ICD-10-CM

## 2023-06-15 DIAGNOSIS — M25.651 DECREASED RANGE OF MOTION OF BOTH HIPS: ICD-10-CM

## 2023-06-15 DIAGNOSIS — M62.81 MUSCLE WEAKNESS OF LOWER EXTREMITY: ICD-10-CM

## 2023-06-15 DIAGNOSIS — M25.652 DECREASED RANGE OF MOTION OF BOTH HIPS: ICD-10-CM

## 2023-06-15 PROCEDURE — 97110 THERAPEUTIC EXERCISES: CPT | Mod: PN

## 2023-06-15 NOTE — PROGRESS NOTES
"[facility]   Physical Therapy Treatment Note      Name: Gabrielle Chung  Clinic Number: [id number]    Therapy Diagnosis:   Encounter Diagnoses   Name Primary?    Impaired functional mobility and activity tolerance Yes    Muscle weakness of lower extremity     Decreased range of motion of both hips      Physician: Stefano Tamez MD    Visit Date: 6/15/2023    Physician Orders: PT Eval and Treat   Medical Diagnosis from Referral: M54.50 (ICD-10-CM) - Lumbar back pain  Evaluation Date: 5/19/2023  Authorization Period Expiration: 12/31/2023  Plan of Care Expiration: 6/30/2023  Progress Note Due: 6/9/2023  Visit # / Visits authorized: 7/20  FOTO: Next at discharge      Precautions: Standard     PTA Visit #: 2/5     Time In: 10:03 am   Time Out: 10:50 am (requests to leave early)  Total Billable Time: 47 minutes    Subjective     Pt reports: Yesterday woke up with intense pain bilateral lumbosacral pain that last the whole morning that she rated 9-10/10 pain level. Today she is having barely any pain rated 1/10 level. Patient likes to sleep on her stomach without a pillow under her hips or head.     She was compliant with home exercise program.  Response to previous treatment: decreased pain evening following, pain the next morning 10/10  Functional change: improved functional mobility; more tolerance to work activities    Pain: 1/10  Location: bilateral buttocks      Objective      5/31/2023  Right anterior innominate rotation (+)    Treatment     Gabrielle received the treatments listed below:      therapeutic exercises to develop strength, endurance, ROM, flexibility, posture, and core stabilization for 27 minutes including:    Recumbent bike: level 5 - 5 minutes   Crossed leg hip quadratus stretch: 5x20"  Lower trunk rotations with arm extended overhead: 20x   Single leg bridges: 2x10 each  Hip hikes: 2x10 each   Donkey kicks: 1 red band - 2x10 bilateral     NEXT: Side plank    HELD:  Transverse " "abdominus contraction 90-90 table top with alternating heel drop: 2x10   Dead bug: 2x10  Supine transverse abdominus contraction with straight arm pull down with alternating straight leg raise: 10x  Bridges with hip abduction in blue belt: 2x10  Step up with knee drive: 6" step with 5# dumbbell - 2x10 bilateral  Half kneeling hip flexor stretch with trunk rotation: 5x10" bilateral  Bird Dog: 10x  Hip hinge: 15x  Dead lift: 12# kettle bell - 2x10 to stool    manual therapy techniques: Manual traction, Myofacial release, Soft tissue Mobilization, and Friction Massage were applied to the: bilateral lower extremity quadrants  for 20 minutes, including:   L4-L5 - grade I-II  Right sided UPAs to L4-L5 - grade I-II  Left sided QL soft tissue mobilizations         Patient Education and Home Exercises       Education provided:   -updated HEP  - use pillow under hips while sleeping prone   - perform LTR with arm overhead and crossed leg quadratus stretch    Written Home Exercises Provided: Patient instructed to cont prior HEP. Exercises were reviewed and Gabrielle was able to demonstrate them prior to the end of the session.  Gabrielle demonstrated good  understanding of the education provided. See EMR under Patient Instructions for exercises provided during therapy sessions    Assessment   Gabrielle is a 47 y.o. female referred to outpatient Physical Therapy with a medical diagnosis of M54.50 (ICD-10-CM) - Lumbar back pain. Performed quick screen as patient's pain levels in lumbosacral region continue to fluctuate. Increased pain with right sided UPAs to L4-L5 and left quadratus lumborum. Performed grade I-II mobilizations to L4-L5 segments for pain relief followed by soft tissue mobilizations to left quadratus to release any tissue restrictions. After, focused on quadratus activation and stretching with good tolerance. Patient continues to request to leave early. Educated patient to sleep with pillow under hips in prone and add " trunk rotations with arms overhead + quadratus stretch to HEP. Will monitor response to change in program.    Gabrielle Is progressing well towards her goals.   Pt prognosis is Good.     Pt will continue to benefit from skilled outpatient physical therapy to address the deficits listed in the problem list box on initial evaluation, provide pt/family education and to maximize pt's level of independence in the home and community environment.     Pt's spiritual, cultural and educational needs considered and pt agreeable to plan of care and goals.     Anticipated barriers to physical therapy: chronicity     Goals:   Short Term Goals (3 Weeks):  1. Patient will be compliant with home exercise program to supplement therapy in promoting functional mobility.  2. Patient will reports </= 5/10 pain at rest to improve quality of life.  3. Patient will report no pain during thoracolumbar active range of motion to promote functional mobility.  4. Patient will improve impaired lower extremity myotomes/manual muscle tests  to >/=4/5 to improve strength for functional tasks.     Long Term Goals (6 Weeks):   1. Patient will improve FOTO score to </= 39% limited to decrease perceived limitation with maintaining/changing body position.   2. Patient will perform transverse abdominus contraction with good control to demonstrate improved core strength.  3. Patient will improve impaired lower extremity myotomes/manual muscle tests to >/=4+/5 to improve strength for functional tasks.  4. Patient will improve bilateral hip external rotation AROM to 30 degrees to improve mobility for functional activities.  5. Patient will be able to return to free-weight exercising at the gym to return to prior level of function.  6. Patient will reports </= 1/10 pain at rest to improve quality of life.  Plan   Monitor innominate symmetry   Hip mobility -- particularly hip extension and ER  core stability program  Glute max/medius strengthening   Progress from  mat to standing theremary Mcdonald, PT

## 2023-06-20 ENCOUNTER — CLINICAL SUPPORT (OUTPATIENT)
Dept: REHABILITATION | Facility: HOSPITAL | Age: 48
End: 2023-06-20
Payer: MEDICAID

## 2023-06-20 DIAGNOSIS — Z74.09 IMPAIRED FUNCTIONAL MOBILITY AND ACTIVITY TOLERANCE: Primary | ICD-10-CM

## 2023-06-20 DIAGNOSIS — M25.651 DECREASED RANGE OF MOTION OF BOTH HIPS: ICD-10-CM

## 2023-06-20 DIAGNOSIS — M25.652 DECREASED RANGE OF MOTION OF BOTH HIPS: ICD-10-CM

## 2023-06-20 DIAGNOSIS — M62.81 MUSCLE WEAKNESS OF LOWER EXTREMITY: ICD-10-CM

## 2023-06-20 PROCEDURE — 97110 THERAPEUTIC EXERCISES: CPT | Mod: PN

## 2023-06-20 NOTE — PROGRESS NOTES
"[facility]   Physical Therapy Treatment Note      Name: Gabrielle Chung  Clinic Number: [id number]    Therapy Diagnosis:   Encounter Diagnoses   Name Primary?    Impaired functional mobility and activity tolerance Yes    Muscle weakness of lower extremity     Decreased range of motion of both hips      Physician: Stefano Tamez MD    Visit Date: 6/20/2023    Physician Orders: PT Eval and Treat   Medical Diagnosis from Referral: M54.50 (ICD-10-CM) - Lumbar back pain  Evaluation Date: 5/19/2023  Authorization Period Expiration: 12/31/2023  Plan of Care Expiration: 6/30/2023  Progress Note Due: 6/9/2023  Visit # / Visits authorized: 8/20  FOTO: Next at discharge      Precautions: Standard     PTA Visit #: 2/5     Time In: 10:03 am   Time Out: 11:00 am   Total Billable Time: 57 minutes    Subjective     Pt reports: improved low back pain upon rising in morning with addition of pillow under hips. Pain levels have remained at bay. Only some discomfort with extended periods of working.     She was compliant with home exercise program.  Response to previous treatment: improved pain levels in the morning since addition of pillow under hips   Functional change: improved functional mobility; more tolerance to work activities    Pain: 0.5-1/10  Location: bilateral buttocks      Objective      5/31/2023  Right anterior innominate rotation (+)    Treatment     Gabrielle received the treatments listed below:      therapeutic exercises to develop strength, endurance, ROM, flexibility, posture, and core stabilization for 52 minutes including:    Soft tissue mobilizations to lacrosse ball: 3 minutes   Crossed leg hip quadratus stretch: 5x20"  Lower trunk rotations with arm extended overhead: 20x   Weight dowel squats: 10 lbs - 2x8  Hip hinging with hands on hips: 8x  Dead lift: 10# dowel - 2x10   Good mornings: 10# dowel - 2x10  Single leg bridges: 2x10 each  Lifts: 5# - 10x (! increased - held left)  3-way swiss " ball roll outs: 10x   Forward lunges: 5# dumbbells - 10x each   Lateral band walks: green - 3 laps 10 steps   Donkey kicks: 1 red band - 2x10 bilateral (cues for transverse abdominus contraction to avoid arching)  Palloff press: doubled red - 20x each   Bird Dog: 15x    manual therapy techniques: Manual traction, Myofacial release, Soft tissue Mobilization, and Friction Massage were applied to the: bilateral lower extremity quadrants  for 5 minutes, including:  Right QL/Glute STM      Patient Education and Home Exercises       Education provided:   -updated HEP  - begin free weight exercise routine    Written Home Exercises Provided: Patient instructed to cont prior HEP. Exercises were reviewed and Gabrielle was able to demonstrate them prior to the end of the session.  Gabrielle demonstrated good  understanding of the education provided. See EMR under Patient Instructions for exercises provided during therapy sessions    Assessment   Gabrielle is a 47 y.o. female referred to outpatient Physical Therapy with a medical diagnosis of M54.50 (ICD-10-CM) - Lumbar back pain. Progressed to more functional mobility activity training with free weights as patient would like to return to working out. Worked on hip hinging technique avoiding thoracolumbar flexion with weight. Improved pain levels with addition of pillow under hips while sleeping. Low level lumbosacral pain remains, but improves with therex. Patient nearing remaining visits.     Gabrielle Is progressing well towards her goals.   Pt prognosis is Good.     Pt will continue to benefit from skilled outpatient physical therapy to address the deficits listed in the problem list box on initial evaluation, provide pt/family education and to maximize pt's level of independence in the home and community environment.     Pt's spiritual, cultural and educational needs considered and pt agreeable to plan of care and goals.     Anticipated barriers to physical therapy: chronicity     Goals:    Short Term Goals (3 Weeks):  1. Patient will be compliant with home exercise program to supplement therapy in promoting functional mobility.  2. Patient will reports </= 5/10 pain at rest to improve quality of life.  3. Patient will report no pain during thoracolumbar active range of motion to promote functional mobility.  4. Patient will improve impaired lower extremity myotomes/manual muscle tests  to >/=4/5 to improve strength for functional tasks.     Long Term Goals (6 Weeks):   1. Patient will improve FOTO score to </= 39% limited to decrease perceived limitation with maintaining/changing body position.   2. Patient will perform transverse abdominus contraction with good control to demonstrate improved core strength.  3. Patient will improve impaired lower extremity myotomes/manual muscle tests to >/=4+/5 to improve strength for functional tasks.  4. Patient will improve bilateral hip external rotation AROM to 30 degrees to improve mobility for functional activities.  5. Patient will be able to return to free-weight exercising at the gym to return to prior level of function.  6. Patient will reports </= 1/10 pain at rest to improve quality of life.  Plan   Monitor innominate symmetry   Hip mobility -- particularly hip extension and ER  core stability program  Glute max/medius strengthening   Progress from mat to standing theremary Mcdonald, PT

## 2023-06-22 ENCOUNTER — CLINICAL SUPPORT (OUTPATIENT)
Dept: REHABILITATION | Facility: HOSPITAL | Age: 48
End: 2023-06-22
Payer: MEDICAID

## 2023-06-22 DIAGNOSIS — M62.81 MUSCLE WEAKNESS OF LOWER EXTREMITY: ICD-10-CM

## 2023-06-22 DIAGNOSIS — Z74.09 IMPAIRED FUNCTIONAL MOBILITY AND ACTIVITY TOLERANCE: Primary | ICD-10-CM

## 2023-06-22 DIAGNOSIS — M25.652 DECREASED RANGE OF MOTION OF BOTH HIPS: ICD-10-CM

## 2023-06-22 DIAGNOSIS — M25.651 DECREASED RANGE OF MOTION OF BOTH HIPS: ICD-10-CM

## 2023-06-22 PROCEDURE — 97110 THERAPEUTIC EXERCISES: CPT | Mod: PN

## 2023-06-22 NOTE — PROGRESS NOTES
"[facility]   Physical Therapy Treatment Note      Name: Gabrielle Chung  Clinic Number: [id number]    Therapy Diagnosis:   No diagnosis found.    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     She was compliant with home exercise program.  Response to previous treatment: soreness  Functional change: improved functional mobility; more tolerance to work activities    Pain: 0/10  Location: bilateral buttocks      Objective      5/31/2023  Right anterior innominate rotation (+)    Treatment     Gabrielle received the treatments listed below:      therapeutic exercises to develop strength, endurance, ROM, flexibility, posture, and core stabilization for 45 minutes including:    Hip hinging with hands on hips: 8x  Good mornings: 10# dowel - 2x10  Dead lift: 10# dowel - 2x10   Weight dowel squats: 10 lbs - 2x8  SAPDs: blue - 2x15  Palloff press: doubled green - 25x each   Lateral band walks: green - 3 laps 10 steps   Lateral stepping with multifidi isometric: 3# dumbbell - 2 laps with 10 steps each   Cybex leg press: 8 plates - 3x10  Forward plank: 3x30"  Side plank: 2x20" bilateral  Bird Dog: 10x with 5# ankle weight on back.    OOT:  Soft tissue mobilizations to lacrosse ball: 3 minutes   Crossed leg hip quadratus stretch: 5x20"  Lower trunk rotations with arm extended overhead: 20x   Single leg bridges: 2x10 " each  Forward lunges: 5# dumbbells - 10x each   Donkey kicks: 1 red band - 2x10 bilateral (cues for transverse abdominus contraction to avoid arching)      manual therapy techniques: Manual traction, Myofacial release, Soft tissue Mobilization, and Friction Massage were applied to the: bilateral lower extremity quadrants  for 00 minutes, including:  Right QL/Glute STM      Patient Education and Home Exercises       Education provided:   -updated HEP  - begin free weight exercise routine    Written Home Exercises Provided: Patient instructed to cont prior HEP. Exercises were reviewed and Gabrielle was able to demonstrate them prior to the end of the session.  Gabrielle demonstrated good  understanding of the education provided. See EMR under Patient Instructions for exercises provided during therapy sessions    Assessment   Gabrielle is a 47 y.o. female referred to outpatient Physical Therapy with a medical diagnosis of M54.50 (ICD-10-CM) - Lumbar back pain. Progressed functional activity strengthening this visit to include more core endurance training and multifidi activation. Expected fatigue with side planks. Some upper extremity assistance needed with right side plank. Trouble maintaining posterior pelvic tilt in quadruped. If pain remains 0/10 next two session, plan to discharge patient. Patient plans on trying previous exercise routine over the next week and will update therapist on tolerance.    Gabrielle Is progressing well towards her goals.   Pt prognosis is Good.     Pt will continue to benefit from skilled outpatient physical therapy to address the deficits listed in the problem list box on initial evaluation, provide pt/family education and to maximize pt's level of independence in the home and community environment.     Pt's spiritual, cultural and educational needs considered and pt agreeable to plan of care and goals.     Anticipated barriers to physical therapy: chronicity     Goals:   Short Term Goals (3  Weeks):  1. Patient will be compliant with home exercise program to supplement therapy in promoting functional mobility.  2. Patient will reports </= 5/10 pain at rest to improve quality of life.  3. Patient will report no pain during thoracolumbar active range of motion to promote functional mobility.  4. Patient will improve impaired lower extremity myotomes/manual muscle tests  to >/=4/5 to improve strength for functional tasks.     Long Term Goals (6 Weeks):   1. Patient will improve FOTO score to </= 39% limited to decrease perceived limitation with maintaining/changing body position.   2. Patient will perform transverse abdominus contraction with good control to demonstrate improved core strength.  3. Patient will improve impaired lower extremity myotomes/manual muscle tests to >/=4+/5 to improve strength for functional tasks.  4. Patient will improve bilateral hip external rotation AROM to 30 degrees to improve mobility for functional activities.  5. Patient will be able to return to free-weight exercising at the gym to return to prior level of function.  6. Patient will reports </= 1/10 pain at rest to improve quality of life.  Plan   Functional activity strengthening  core stability program and endurance training   Glute max/medius strengthening     Hollie Mcdonald, PT

## 2023-06-30 ENCOUNTER — CLINICAL SUPPORT (OUTPATIENT)
Dept: REHABILITATION | Facility: HOSPITAL | Age: 48
End: 2023-06-30
Payer: MEDICAID

## 2023-06-30 DIAGNOSIS — M62.81 MUSCLE WEAKNESS OF LOWER EXTREMITY: ICD-10-CM

## 2023-06-30 DIAGNOSIS — M25.651 DECREASED RANGE OF MOTION OF BOTH HIPS: ICD-10-CM

## 2023-06-30 DIAGNOSIS — M25.652 DECREASED RANGE OF MOTION OF BOTH HIPS: ICD-10-CM

## 2023-06-30 DIAGNOSIS — Z74.09 IMPAIRED FUNCTIONAL MOBILITY AND ACTIVITY TOLERANCE: Primary | ICD-10-CM

## 2023-06-30 PROCEDURE — 97110 THERAPEUTIC EXERCISES: CPT | Mod: PN

## 2023-06-30 NOTE — PROGRESS NOTES
[facility]   Physical Therapy Treatment Note / discharge     Name: Gabrielle Chung  Clinic Number: [id number]    Therapy Diagnosis:   Encounter Diagnoses   Name Primary?    Impaired functional mobility and activity tolerance Yes    Muscle weakness of lower extremity     Decreased range of motion of both hips        Physician: Stefano Tamez MD    Visit Date: 6/30/2023    Physician Orders: PT Eval and Treat   Medical Diagnosis from Referral: M54.50 (ICD-10-CM) - Lumbar back pain  Evaluation Date: 5/19/2023  Authorization Period Expiration: 12/31/2023  Plan of Care Expiration: 6/30/2023  Progress Note Due: 6/9/2023  Visit # / Visits authorized: 10/20  FOTO: Next at discharge      Precautions: Standard     PTA Visit #: 2/5     Time In: 8:05 am  Time Out: 8:30 am   Total Billable Time: 25 minutes    Subjective     Pt reports: her back has been feeling a lot better. She is ready for discharge.    She was compliant with home exercise program.  Response to previous treatment: soreness  Functional change: improved functional mobility; more tolerance to work activities    Pain: 0/10  Location: bilateral buttocks      Objective        6/30/2023  AROM:    Degrees Pain/Dysfunction   Flexion fingers tips above floor [place]'s Sign: -   Extension 100% NP   Right Side Bending  100% NP   Left Side Bending  100% NP   Right Rotation 100% NP   Left Rotation 100% NP      Hip AROM:    RLE LLE   External Rotation 20 -- 34 21 --35         Strength:  RLE   LLE     Hip flexion: 5/5 Hip flexion: 5/5   Hip Abduction: 5/5 Hip abduction: 5/5   Hip extension 4+/5 Hip extension 4+/5   Knee flexion: 4+/5 Knee flexion: 4+/5   Knee extension: 5/5 Knee extension: 4+/5   Ankle Dorsiflexion: 5/5 Ankle Dorsiflexion: 5/5   Ankle Plantarflexion: 5/5 Ankle Plantarflexion: 5/5       Treatment     Gabrielle received the treatments listed below:    Therapeutic activities to improve functional performance for 25 minutes,  including:  FOTO  Objective tests and measures  Outcome measures  Home exercise program overview   Questions and answers   Discharge     Patient Education and Home Exercises       Education provided:   -updated HEP  - begin free weight exercise routine    Written Home Exercises Provided: Patient instructed to cont prior HEP. Exercises were reviewed and Gabrielle was able to demonstrate them prior to the end of the session.  Gabrielle demonstrated good  understanding of the education provided. See EMR under Patient Instructions for exercises provided during therapy sessions    Assessment   Gabrielle is a 47 y.o. female referred to outpatient Physical Therapy with a medical diagnosis of M54.50 (ICD-10-CM) - Lumbar back pain. Patient no longer experiencing pain and is ready and agreeable for discharge. Has returned to prior level of function and activities improving core stability, gluteal strength, and posterior chain activation. Patient has returned to free weight exercises and work pain-free. Provided updated HEP and will be discharged at this time.    Gabrielle Is progressing well towards her goals.   Pt prognosis is Good.     Pt will continue to benefit from skilled outpatient physical therapy to address the deficits listed in the problem list box on initial evaluation, provide pt/family education and to maximize pt's level of independence in the home and community environment.     Pt's spiritual, cultural and educational needs considered and pt agreeable to plan of care and goals.     Anticipated barriers to physical therapy: chronicity     Goals:   Short Term Goals (3 Weeks):  1. Patient will be compliant with home exercise program to supplement therapy in promoting functional mobility. MET 6/30/2023  2. Patient will reports </= 5/10 pain at rest to improve quality of life. MET 6/30/2023  3. Patient will report no pain during thoracolumbar active range of motion to promote functional mobility. MET 6/30/2023  4. Patient will  improve impaired lower extremity myotomes/manual muscle tests  to >/=4/5 to improve strength for functional tasks. MET 6/30/2023     Long Term Goals (6 Weeks):   1. Patient will improve FOTO score to </= 39% limited to decrease perceived limitation with maintaining/changing body position. MET 6/30/2023  2. Patient will perform transverse abdominus contraction with good control to demonstrate improved core strength. MET 6/30/2023  3. Patient will improve impaired lower extremity myotomes/manual muscle tests to >/=4+/5 to improve strength for functional tasks.MET 6/30/2023  4. Patient will improve bilateral hip external rotation AROM to 30 degrees to improve mobility for functional activities. MET 6/30/2023  5. Patient will be able to return to free-weight exercising at the gym to return to prior level of function.MET 6/30/2023  6. Patient will reports </= 1/10 pain at rest to improve quality of life.MET 6/30/2023  Plan   discharge     Hollie Mcdonald, PT

## 2023-11-28 ENCOUNTER — OFFICE VISIT (OUTPATIENT)
Dept: FAMILY MEDICINE | Facility: HOSPITAL | Age: 48
End: 2023-11-28
Payer: MEDICAID

## 2023-11-28 VITALS
DIASTOLIC BLOOD PRESSURE: 81 MMHG | WEIGHT: 128.06 LBS | HEIGHT: 64 IN | SYSTOLIC BLOOD PRESSURE: 119 MMHG | HEART RATE: 67 BPM | BODY MASS INDEX: 21.86 KG/M2

## 2023-11-28 DIAGNOSIS — G89.29 ELBOW PAIN, CHRONIC, RIGHT: Primary | ICD-10-CM

## 2023-11-28 DIAGNOSIS — M25.521 ELBOW PAIN, CHRONIC, RIGHT: Primary | ICD-10-CM

## 2023-11-28 PROCEDURE — 99213 OFFICE O/P EST LOW 20 MIN: CPT

## 2023-11-28 NOTE — PROGRESS NOTES
[facility]  History & Physical    SUBJECTIVE:     Chief Complaint:   Chief Complaint   Patient presents with    joint inflamation       History of Present Illness:  48 y.o. female who  has no past medical history on file. presents to clinic today for follow up    #Back pain  Patient was seen at last visit with symptoms of lumbar back pain with associated sciatica. Patient was sent to PT for symptoms and is doing well after her PT sessions. Patient stating that her back pain has resolved and is close to her baseline function.    #Elbow pain  Patient with left elbow pain. Patient states this has been a chronic issue for her. 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       Allergies:  Review of patient's allergies indicates:  No Known Allergies    Home Medications:  Current Outpatient Medications on File Prior to Visit   Medication Sig    diclofenac sodium (VOLTAREN) 1 % Gel Apply 2 g topically 4 (four) times daily.    LIDOcaine (LIDODERM) 5 % Place 1 patch onto the skin once daily. Remove & Discard patch within 12 hours or as directed by MD     No current facility-administered medications on file prior to visit.       No past medical history on file.  No past surgical history on file.  Family History   Problem Relation Age of Onset    Breast cancer Maternal Aunt 40     Social History     Tobacco Use    Smoking status: Never    Smokeless tobacco: Never   Substance Use Topics    Alcohol use: Never          OBJECTIVE:     Vital Signs:  Pulse: 67 (11/28/23 1429)  BP: 119/81 (11/28/23 1429)    Physical Exam  Constitutional:       General: She is not in acute distress.     Appearance: Normal appearance. She is not toxic-appearing.   HENT:      Head: Normocephalic and atraumatic.      Right Ear: External ear normal.      Left Ear: External ear normal.      Nose: Nose normal.      Mouth/Throat:      Mouth:  Mucous membranes are moist.   Eyes:      Extraocular Movements: Extraocular movements intact.   Cardiovascular:      Rate and Rhythm: Normal rate and regular rhythm.      Pulses: Normal pulses.      Heart sounds: Normal heart sounds.   Pulmonary:      Effort: Pulmonary effort is normal. No respiratory distress.   Abdominal:      General: Abdomen is flat.      Palpations: Abdomen is soft.      Tenderness: There is no abdominal tenderness.   Musculoskeletal:      Cervical back: Normal range of motion and neck supple.      Comments: TTP to lateral epicondyle of left elbow. Normal ROM and 5/5 motor strength.    Skin:     General: Skin is warm.      Findings: No bruising or erythema.   Neurological:      General: No focal deficit present.      Mental Status: She is alert.   Psychiatric:         Mood and Affect: Mood normal.         Laboratory:  Hemoglobin A1C   Date Value Ref Range Status   03/30/2021 5.1 4.0 - 5.6 % Final     Comment:     ADA Screening Guidelines:  5.7-6.4%  Consistent with prediabetes  >or=6.5%  Consistent with diabetes    High levels of fetal hemoglobin interfere with the HbA1C  assay. Heterozygous hemoglobin variants (HbS, HgC, etc)do  not significantly interfere with this assay.   However, presence of multiple variants may affect accuracy.         A/P:  Gabrielle was seen today for joint inflamation.    Diagnoses and all orders for this visit:    Elbow pain, chronic, right    Patient history, symptoms, and physical exam findings likely point to lateral epicondylitis. Will treat conservatively with topical/oral NSAIDs prn and elbow bracing. If patient with persistent symptoms long-term, can consider orthopedic referral for potential injections.     Follow up in 2-3 months    Stefano Tamez MD PGY-3  [facility]